# Patient Record
Sex: FEMALE | Race: WHITE | NOT HISPANIC OR LATINO | Employment: OTHER | URBAN - NONMETROPOLITAN AREA
[De-identification: names, ages, dates, MRNs, and addresses within clinical notes are randomized per-mention and may not be internally consistent; named-entity substitution may affect disease eponyms.]

---

## 2024-06-13 ENCOUNTER — APPOINTMENT (OUTPATIENT)
Dept: CARDIOLOGY | Facility: HOSPITAL | Age: 69
DRG: 195 | End: 2024-06-13
Payer: MEDICARE

## 2024-06-13 ENCOUNTER — HOSPITAL ENCOUNTER (INPATIENT)
Facility: HOSPITAL | Age: 69
LOS: 1 days | Discharge: HOME | DRG: 195 | End: 2024-06-15
Attending: INTERNAL MEDICINE | Admitting: INTERNAL MEDICINE
Payer: MEDICARE

## 2024-06-13 ENCOUNTER — APPOINTMENT (OUTPATIENT)
Dept: RADIOLOGY | Facility: HOSPITAL | Age: 69
DRG: 195 | End: 2024-06-13
Payer: MEDICARE

## 2024-06-13 DIAGNOSIS — R06.02 SHORTNESS OF BREATH: ICD-10-CM

## 2024-06-13 DIAGNOSIS — R53.1 GENERALIZED WEAKNESS: ICD-10-CM

## 2024-06-13 DIAGNOSIS — J15.9 COMMUNITY ACQUIRED BACTERIAL PNEUMONIA: Primary | ICD-10-CM

## 2024-06-13 DIAGNOSIS — J18.9 PNEUMONIA DUE TO INFECTIOUS ORGANISM, UNSPECIFIED LATERALITY, UNSPECIFIED PART OF LUNG: ICD-10-CM

## 2024-06-13 LAB
ALBUMIN SERPL BCP-MCNC: 4.4 G/DL (ref 3.4–5)
ALP SERPL-CCNC: 56 U/L (ref 33–136)
ALT SERPL W P-5'-P-CCNC: 20 U/L (ref 7–45)
ANION GAP BLDV CALCULATED.4IONS-SCNC: 10 MMOL/L (ref 10–25)
ANION GAP SERPL CALC-SCNC: 12 MMOL/L (ref 10–20)
APPEARANCE UR: CLEAR
AST SERPL W P-5'-P-CCNC: 25 U/L (ref 9–39)
ATRIAL RATE: 112 BPM
BASE EXCESS BLDV CALC-SCNC: 3.6 MMOL/L (ref -2–3)
BASOPHILS # BLD AUTO: 0.02 X10*3/UL (ref 0–0.1)
BASOPHILS NFR BLD AUTO: 0.3 %
BILIRUB SERPL-MCNC: 0.4 MG/DL (ref 0–1.2)
BILIRUB UR STRIP.AUTO-MCNC: NEGATIVE MG/DL
BNP SERPL-MCNC: 54 PG/ML (ref 0–99)
BODY TEMPERATURE: ABNORMAL
BUN SERPL-MCNC: 18 MG/DL (ref 6–23)
CA-I BLDV-SCNC: 1.1 MMOL/L (ref 1.1–1.33)
CALCIUM SERPL-MCNC: 9 MG/DL (ref 8.6–10.3)
CARDIAC TROPONIN I PNL SERPL HS: 6 NG/L (ref 0–13)
CARDIAC TROPONIN I PNL SERPL HS: 7 NG/L (ref 0–13)
CHLORIDE BLDV-SCNC: 100 MMOL/L (ref 98–107)
CHLORIDE SERPL-SCNC: 101 MMOL/L (ref 98–107)
CO2 SERPL-SCNC: 28 MMOL/L (ref 21–32)
COLOR UR: ABNORMAL
CREAT SERPL-MCNC: 0.75 MG/DL (ref 0.5–1.05)
EGFRCR SERPLBLD CKD-EPI 2021: 86 ML/MIN/1.73M*2
EOSINOPHIL # BLD AUTO: 0 X10*3/UL (ref 0–0.7)
EOSINOPHIL NFR BLD AUTO: 0 %
ERYTHROCYTE [DISTWIDTH] IN BLOOD BY AUTOMATED COUNT: 14.3 % (ref 11.5–14.5)
FLUAV RNA RESP QL NAA+PROBE: NOT DETECTED
FLUBV RNA RESP QL NAA+PROBE: NOT DETECTED
GLUCOSE BLDV-MCNC: 148 MG/DL (ref 74–99)
GLUCOSE SERPL-MCNC: 143 MG/DL (ref 74–99)
GLUCOSE UR STRIP.AUTO-MCNC: NORMAL MG/DL
HCO3 BLDV-SCNC: 28.5 MMOL/L (ref 22–26)
HCT VFR BLD AUTO: 41 % (ref 36–46)
HCT VFR BLD EST: 44 % (ref 36–46)
HGB BLD-MCNC: 13.5 G/DL (ref 12–16)
HGB BLDV-MCNC: 14.5 G/DL (ref 12–16)
IMM GRANULOCYTES # BLD AUTO: 0.02 X10*3/UL (ref 0–0.7)
IMM GRANULOCYTES NFR BLD AUTO: 0.3 % (ref 0–0.9)
INHALED O2 CONCENTRATION: 21 %
KETONES UR STRIP.AUTO-MCNC: ABNORMAL MG/DL
LACTATE BLDV-SCNC: 1.4 MMOL/L (ref 0.4–2)
LACTATE SERPL-SCNC: 1.3 MMOL/L (ref 0.4–2)
LEUKOCYTE ESTERASE UR QL STRIP.AUTO: NEGATIVE
LIPASE SERPL-CCNC: 13 U/L (ref 9–82)
LYMPHOCYTES # BLD AUTO: 0.46 X10*3/UL (ref 1.2–4.8)
LYMPHOCYTES NFR BLD AUTO: 5.9 %
MAGNESIUM SERPL-MCNC: 1.86 MG/DL (ref 1.6–2.4)
MCH RBC QN AUTO: 28.5 PG (ref 26–34)
MCHC RBC AUTO-ENTMCNC: 32.9 G/DL (ref 32–36)
MCV RBC AUTO: 87 FL (ref 80–100)
MONOCYTES # BLD AUTO: 0.43 X10*3/UL (ref 0.1–1)
MONOCYTES NFR BLD AUTO: 5.5 %
MUCOUS THREADS #/AREA URNS AUTO: ABNORMAL /LPF
NEUTROPHILS # BLD AUTO: 6.91 X10*3/UL (ref 1.2–7.7)
NEUTROPHILS NFR BLD AUTO: 88 %
NITRITE UR QL STRIP.AUTO: NEGATIVE
NRBC BLD-RTO: 0 /100 WBCS (ref 0–0)
OXYHGB MFR BLDV: 48.5 % (ref 45–75)
P AXIS: 77 DEGREES
P OFFSET: 193 MS
P ONSET: 142 MS
PCO2 BLDV: 43 MM HG (ref 41–51)
PH BLDV: 7.43 PH (ref 7.33–7.43)
PH UR STRIP.AUTO: 7 [PH]
PLATELET # BLD AUTO: 153 X10*3/UL (ref 150–450)
PO2 BLDV: 31 MM HG (ref 35–45)
POTASSIUM BLDV-SCNC: 3.9 MMOL/L (ref 3.5–5.3)
POTASSIUM SERPL-SCNC: 3.7 MMOL/L (ref 3.5–5.3)
PR INTERVAL: 162 MS
PROT SERPL-MCNC: 7.4 G/DL (ref 6.4–8.2)
PROT UR STRIP.AUTO-MCNC: ABNORMAL MG/DL
Q ONSET: 223 MS
QRS COUNT: 18 BEATS
QRS DURATION: 90 MS
QT INTERVAL: 350 MS
QTC CALCULATION(BAZETT): 477 MS
QTC FREDERICIA: 431 MS
R AXIS: 35 DEGREES
RBC # BLD AUTO: 4.73 X10*6/UL (ref 4–5.2)
RBC # UR STRIP.AUTO: ABNORMAL /UL
RBC #/AREA URNS AUTO: >20 /HPF
RSV RNA RESP QL NAA+PROBE: NOT DETECTED
SAO2 % BLDV: 49 % (ref 45–75)
SARS-COV-2 RNA RESP QL NAA+PROBE: NOT DETECTED
SODIUM BLDV-SCNC: 135 MMOL/L (ref 136–145)
SODIUM SERPL-SCNC: 137 MMOL/L (ref 136–145)
SP GR UR STRIP.AUTO: >1.05
SQUAMOUS #/AREA URNS AUTO: ABNORMAL /HPF
T AXIS: 62 DEGREES
T OFFSET: 398 MS
UROBILINOGEN UR STRIP.AUTO-MCNC: NORMAL MG/DL
VENTRICULAR RATE: 112 BPM
WBC # BLD AUTO: 7.8 X10*3/UL (ref 4.4–11.3)
WBC #/AREA URNS AUTO: ABNORMAL /HPF

## 2024-06-13 PROCEDURE — 96375 TX/PRO/DX INJ NEW DRUG ADDON: CPT | Mod: 59

## 2024-06-13 PROCEDURE — 84132 ASSAY OF SERUM POTASSIUM: CPT

## 2024-06-13 PROCEDURE — 2550000001 HC RX 255 CONTRASTS

## 2024-06-13 PROCEDURE — 83605 ASSAY OF LACTIC ACID: CPT | Mod: 91

## 2024-06-13 PROCEDURE — 2500000004 HC RX 250 GENERAL PHARMACY W/ HCPCS (ALT 636 FOR OP/ED)

## 2024-06-13 PROCEDURE — 2500000001 HC RX 250 WO HCPCS SELF ADMINISTERED DRUGS (ALT 637 FOR MEDICARE OP)

## 2024-06-13 PROCEDURE — 99285 EMERGENCY DEPT VISIT HI MDM: CPT

## 2024-06-13 PROCEDURE — 94640 AIRWAY INHALATION TREATMENT: CPT

## 2024-06-13 PROCEDURE — 94760 N-INVAS EAR/PLS OXIMETRY 1: CPT

## 2024-06-13 PROCEDURE — 81001 URINALYSIS AUTO W/SCOPE: CPT

## 2024-06-13 PROCEDURE — 96368 THER/DIAG CONCURRENT INF: CPT

## 2024-06-13 PROCEDURE — G0378 HOSPITAL OBSERVATION PER HR: HCPCS

## 2024-06-13 PROCEDURE — 83690 ASSAY OF LIPASE: CPT

## 2024-06-13 PROCEDURE — 36415 COLL VENOUS BLD VENIPUNCTURE: CPT

## 2024-06-13 PROCEDURE — 87040 BLOOD CULTURE FOR BACTERIA: CPT | Mod: GENLAB

## 2024-06-13 PROCEDURE — 2500000002 HC RX 250 W HCPCS SELF ADMINISTERED DRUGS (ALT 637 FOR MEDICARE OP, ALT 636 FOR OP/ED)

## 2024-06-13 PROCEDURE — 83735 ASSAY OF MAGNESIUM: CPT

## 2024-06-13 PROCEDURE — 84145 PROCALCITONIN (PCT): CPT | Mod: GENLAB

## 2024-06-13 PROCEDURE — 80053 COMPREHEN METABOLIC PANEL: CPT

## 2024-06-13 PROCEDURE — 93005 ELECTROCARDIOGRAM TRACING: CPT

## 2024-06-13 PROCEDURE — 85025 COMPLETE CBC W/AUTO DIFF WBC: CPT

## 2024-06-13 PROCEDURE — 84484 ASSAY OF TROPONIN QUANT: CPT

## 2024-06-13 PROCEDURE — 71275 CT ANGIOGRAPHY CHEST: CPT | Performed by: RADIOLOGY

## 2024-06-13 PROCEDURE — 96361 HYDRATE IV INFUSION ADD-ON: CPT

## 2024-06-13 PROCEDURE — 71275 CT ANGIOGRAPHY CHEST: CPT

## 2024-06-13 PROCEDURE — 74177 CT ABD & PELVIS W/CONTRAST: CPT | Performed by: RADIOLOGY

## 2024-06-13 PROCEDURE — 96365 THER/PROPH/DIAG IV INF INIT: CPT | Mod: 59

## 2024-06-13 PROCEDURE — 96372 THER/PROPH/DIAG INJ SC/IM: CPT

## 2024-06-13 PROCEDURE — 83880 ASSAY OF NATRIURETIC PEPTIDE: CPT

## 2024-06-13 PROCEDURE — 74177 CT ABD & PELVIS W/CONTRAST: CPT

## 2024-06-13 PROCEDURE — 87637 SARSCOV2&INF A&B&RSV AMP PRB: CPT

## 2024-06-13 PROCEDURE — 84484 ASSAY OF TROPONIN QUANT: CPT | Mod: 91

## 2024-06-13 RX ORDER — ACETAMINOPHEN 325 MG/1
TABLET ORAL
Status: COMPLETED
Start: 2024-06-13 | End: 2024-06-13

## 2024-06-13 RX ORDER — CEFTRIAXONE 1 G/50ML
1 INJECTION, SOLUTION INTRAVENOUS ONCE
Status: COMPLETED | OUTPATIENT
Start: 2024-06-13 | End: 2024-06-13

## 2024-06-13 RX ORDER — IPRATROPIUM BROMIDE AND ALBUTEROL SULFATE 2.5; .5 MG/3ML; MG/3ML
SOLUTION RESPIRATORY (INHALATION)
Status: COMPLETED
Start: 2024-06-13 | End: 2024-06-13

## 2024-06-13 RX ORDER — PANTOPRAZOLE SODIUM 40 MG/10ML
40 INJECTION, POWDER, LYOPHILIZED, FOR SOLUTION INTRAVENOUS
Status: DISCONTINUED | OUTPATIENT
Start: 2024-06-14 | End: 2024-06-15 | Stop reason: HOSPADM

## 2024-06-13 RX ORDER — LIOTHYRONINE SODIUM 5 UG/1
10 TABLET ORAL 2 TIMES DAILY
COMMUNITY

## 2024-06-13 RX ORDER — KETOROLAC TROMETHAMINE 15 MG/ML
15 INJECTION, SOLUTION INTRAMUSCULAR; INTRAVENOUS ONCE
Status: COMPLETED | OUTPATIENT
Start: 2024-06-13 | End: 2024-06-13

## 2024-06-13 RX ORDER — PROBENECID AND COLCHICINE .5; 5 MG/1; MG/1
1 TABLET ORAL DAILY
Status: ON HOLD | COMMUNITY
End: 2024-06-14

## 2024-06-13 RX ORDER — CEFTRIAXONE 2 G/50ML
2 INJECTION, SOLUTION INTRAVENOUS EVERY 24 HOURS
Status: DISCONTINUED | OUTPATIENT
Start: 2024-06-14 | End: 2024-06-15 | Stop reason: HOSPADM

## 2024-06-13 RX ORDER — SODIUM CHLORIDE 9 MG/ML
INJECTION, SOLUTION INTRAVENOUS
Status: DISPENSED
Start: 2024-06-13 | End: 2024-06-14

## 2024-06-13 RX ORDER — AZITHROMYCIN MONOHYDRATE 500 MG/5ML
INJECTION, POWDER, LYOPHILIZED, FOR SOLUTION INTRAVENOUS
Status: COMPLETED
Start: 2024-06-13 | End: 2024-06-13

## 2024-06-13 RX ORDER — ONDANSETRON 4 MG/1
4 TABLET, FILM COATED ORAL EVERY 8 HOURS PRN
Status: DISCONTINUED | OUTPATIENT
Start: 2024-06-13 | End: 2024-06-15 | Stop reason: HOSPADM

## 2024-06-13 RX ORDER — ENOXAPARIN SODIUM 100 MG/ML
40 INJECTION SUBCUTANEOUS EVERY 24 HOURS
Status: DISCONTINUED | OUTPATIENT
Start: 2024-06-13 | End: 2024-06-15 | Stop reason: HOSPADM

## 2024-06-13 RX ORDER — IPRATROPIUM BROMIDE AND ALBUTEROL SULFATE 2.5; .5 MG/3ML; MG/3ML
3 SOLUTION RESPIRATORY (INHALATION)
Status: DISCONTINUED | OUTPATIENT
Start: 2024-06-13 | End: 2024-06-13

## 2024-06-13 RX ORDER — ONDANSETRON HYDROCHLORIDE 2 MG/ML
4 INJECTION, SOLUTION INTRAVENOUS ONCE
Status: COMPLETED | OUTPATIENT
Start: 2024-06-13 | End: 2024-06-13

## 2024-06-13 RX ORDER — PANTOPRAZOLE SODIUM 40 MG/1
40 TABLET, DELAYED RELEASE ORAL
Status: DISCONTINUED | OUTPATIENT
Start: 2024-06-14 | End: 2024-06-15 | Stop reason: HOSPADM

## 2024-06-13 RX ORDER — GUAIFENESIN/DEXTROMETHORPHAN 100-10MG/5
5 SYRUP ORAL EVERY 4 HOURS PRN
Status: DISCONTINUED | OUTPATIENT
Start: 2024-06-13 | End: 2024-06-15 | Stop reason: HOSPADM

## 2024-06-13 RX ORDER — POLYETHYLENE GLYCOL 3350 17 G/17G
17 POWDER, FOR SOLUTION ORAL DAILY PRN
Status: DISCONTINUED | OUTPATIENT
Start: 2024-06-13 | End: 2024-06-15 | Stop reason: HOSPADM

## 2024-06-13 RX ORDER — LEVOTHYROXINE SODIUM 75 UG/1
88 TABLET ORAL DAILY
COMMUNITY

## 2024-06-13 RX ORDER — GUAIFENESIN 600 MG/1
600 TABLET, EXTENDED RELEASE ORAL EVERY 12 HOURS PRN
Status: DISCONTINUED | OUTPATIENT
Start: 2024-06-13 | End: 2024-06-15 | Stop reason: HOSPADM

## 2024-06-13 RX ORDER — ACETAMINOPHEN 325 MG/1
650 TABLET ORAL EVERY 4 HOURS PRN
Status: DISCONTINUED | OUTPATIENT
Start: 2024-06-13 | End: 2024-06-13

## 2024-06-13 RX ORDER — SERTRALINE HYDROCHLORIDE 50 MG/1
50 TABLET, FILM COATED ORAL DAILY
COMMUNITY

## 2024-06-13 RX ORDER — ATORVASTATIN CALCIUM 20 MG/1
20 TABLET, FILM COATED ORAL NIGHTLY
COMMUNITY

## 2024-06-13 RX ORDER — SODIUM CHLORIDE 9 MG/ML
100 INJECTION, SOLUTION INTRAVENOUS CONTINUOUS
Status: DISCONTINUED | OUTPATIENT
Start: 2024-06-13 | End: 2024-06-13

## 2024-06-13 RX ORDER — ACETAMINOPHEN 325 MG/1
650 TABLET ORAL EVERY 4 HOURS PRN
Status: DISCONTINUED | OUTPATIENT
Start: 2024-06-13 | End: 2024-06-15 | Stop reason: HOSPADM

## 2024-06-13 RX ORDER — ACETAMINOPHEN 325 MG/1
975 TABLET ORAL ONCE
Status: COMPLETED | OUTPATIENT
Start: 2024-06-13 | End: 2024-06-13

## 2024-06-13 RX ORDER — TALC
9 POWDER (GRAM) TOPICAL NIGHTLY PRN
Status: DISCONTINUED | OUTPATIENT
Start: 2024-06-13 | End: 2024-06-14

## 2024-06-13 RX ORDER — MELATONIN 1 MG
TABLET,CHEWABLE ORAL
COMMUNITY

## 2024-06-13 RX ORDER — IPRATROPIUM BROMIDE AND ALBUTEROL SULFATE 2.5; .5 MG/3ML; MG/3ML
3 SOLUTION RESPIRATORY (INHALATION) 3 TIMES DAILY
Status: DISCONTINUED | OUTPATIENT
Start: 2024-06-13 | End: 2024-06-15 | Stop reason: HOSPADM

## 2024-06-13 RX ORDER — ONDANSETRON HYDROCHLORIDE 2 MG/ML
4 INJECTION, SOLUTION INTRAVENOUS EVERY 8 HOURS PRN
Status: DISCONTINUED | OUTPATIENT
Start: 2024-06-13 | End: 2024-06-15 | Stop reason: HOSPADM

## 2024-06-13 RX ORDER — ALBUTEROL SULFATE 0.83 MG/ML
2.5 SOLUTION RESPIRATORY (INHALATION) EVERY 2 HOUR PRN
Status: DISCONTINUED | OUTPATIENT
Start: 2024-06-13 | End: 2024-06-15 | Stop reason: HOSPADM

## 2024-06-13 RX ADMIN — IPRATROPIUM BROMIDE AND ALBUTEROL SULFATE 3 ML: .5; 3 SOLUTION RESPIRATORY (INHALATION) at 22:39

## 2024-06-13 RX ADMIN — AZITHROMYCIN 500 MG: 500 INJECTION, POWDER, LYOPHILIZED, FOR SOLUTION INTRAVENOUS at 16:05

## 2024-06-13 RX ADMIN — ENOXAPARIN SODIUM 40 MG: 40 INJECTION SUBCUTANEOUS at 20:20

## 2024-06-13 RX ADMIN — SODIUM CHLORIDE 1000 ML: 9 INJECTION, SOLUTION INTRAVENOUS at 15:19

## 2024-06-13 RX ADMIN — SODIUM CHLORIDE 1000 ML: 9 INJECTION, SOLUTION INTRAVENOUS at 15:34

## 2024-06-13 RX ADMIN — BENZOCAINE AND MENTHOL 1 LOZENGE: 15; 3.6 LOZENGE ORAL at 20:31

## 2024-06-13 RX ADMIN — ACETAMINOPHEN 650 MG: 325 TABLET ORAL at 20:20

## 2024-06-13 RX ADMIN — Medication 9 MG: at 20:31

## 2024-06-13 RX ADMIN — KETOROLAC TROMETHAMINE 15 MG: 15 INJECTION INTRAMUSCULAR; INTRAVENOUS at 15:19

## 2024-06-13 RX ADMIN — CEFTRIAXONE 1 G: 1 INJECTION, SOLUTION INTRAVENOUS at 16:07

## 2024-06-13 RX ADMIN — ACETAMINOPHEN 975 MG: 325 TABLET ORAL at 16:13

## 2024-06-13 RX ADMIN — ONDANSETRON 4 MG: 2 INJECTION INTRAMUSCULAR; INTRAVENOUS at 15:18

## 2024-06-13 RX ADMIN — IOHEXOL 75 ML: 350 INJECTION, SOLUTION INTRAVENOUS at 15:29

## 2024-06-13 SDOH — SOCIAL STABILITY: SOCIAL INSECURITY: DO YOU FEEL ANYONE HAS EXPLOITED OR TAKEN ADVANTAGE OF YOU FINANCIALLY OR OF YOUR PERSONAL PROPERTY?: NO

## 2024-06-13 SDOH — SOCIAL STABILITY: SOCIAL INSECURITY: DOES ANYONE TRY TO KEEP YOU FROM HAVING/CONTACTING OTHER FRIENDS OR DOING THINGS OUTSIDE YOUR HOME?: NO

## 2024-06-13 SDOH — SOCIAL STABILITY: SOCIAL INSECURITY: HAVE YOU HAD THOUGHTS OF HARMING ANYONE ELSE?: NO

## 2024-06-13 SDOH — SOCIAL STABILITY: SOCIAL INSECURITY: WERE YOU ABLE TO COMPLETE ALL THE BEHAVIORAL HEALTH SCREENINGS?: YES

## 2024-06-13 SDOH — SOCIAL STABILITY: SOCIAL INSECURITY: ARE YOU OR HAVE YOU BEEN THREATENED OR ABUSED PHYSICALLY, EMOTIONALLY, OR SEXUALLY BY ANYONE?: NO

## 2024-06-13 SDOH — SOCIAL STABILITY: SOCIAL INSECURITY: ARE THERE ANY APPARENT SIGNS OF INJURIES/BEHAVIORS THAT COULD BE RELATED TO ABUSE/NEGLECT?: NO

## 2024-06-13 SDOH — SOCIAL STABILITY: SOCIAL INSECURITY: HAS ANYONE EVER THREATENED TO HURT YOUR FAMILY OR YOUR PETS?: NO

## 2024-06-13 SDOH — SOCIAL STABILITY: SOCIAL INSECURITY: DO YOU FEEL UNSAFE GOING BACK TO THE PLACE WHERE YOU ARE LIVING?: NO

## 2024-06-13 SDOH — SOCIAL STABILITY: SOCIAL INSECURITY: HAVE YOU HAD ANY THOUGHTS OF HARMING ANYONE ELSE?: NO

## 2024-06-13 SDOH — SOCIAL STABILITY: SOCIAL INSECURITY: ABUSE: ADULT

## 2024-06-13 ASSESSMENT — COGNITIVE AND FUNCTIONAL STATUS - GENERAL
TOILETING: A LITTLE
HELP NEEDED FOR BATHING: A LITTLE
TOILETING: A LITTLE
PATIENT BASELINE BEDBOUND: NO
HELP NEEDED FOR BATHING: A LITTLE
CLIMB 3 TO 5 STEPS WITH RAILING: A LOT
MOBILITY SCORE: 19
STANDING UP FROM CHAIR USING ARMS: A LITTLE
DRESSING REGULAR UPPER BODY CLOTHING: A LITTLE
DRESSING REGULAR UPPER BODY CLOTHING: A LITTLE
DRESSING REGULAR LOWER BODY CLOTHING: A LITTLE
CLIMB 3 TO 5 STEPS WITH RAILING: A LOT
DAILY ACTIVITIY SCORE: 20
STANDING UP FROM CHAIR USING ARMS: A LITTLE
DRESSING REGULAR LOWER BODY CLOTHING: A LITTLE
WALKING IN HOSPITAL ROOM: A LITTLE
MOVING TO AND FROM BED TO CHAIR: A LITTLE
WALKING IN HOSPITAL ROOM: A LITTLE
MOVING TO AND FROM BED TO CHAIR: A LITTLE

## 2024-06-13 ASSESSMENT — LIFESTYLE VARIABLES
HOW OFTEN DO YOU HAVE A DRINK CONTAINING ALCOHOL: NEVER
AUDIT-C TOTAL SCORE: 0
SKIP TO QUESTIONS 9-10: 1
HOW MANY STANDARD DRINKS CONTAINING ALCOHOL DO YOU HAVE ON A TYPICAL DAY: PATIENT DOES NOT DRINK
AUDIT-C TOTAL SCORE: 0
HOW OFTEN DO YOU HAVE 6 OR MORE DRINKS ON ONE OCCASION: NEVER

## 2024-06-13 ASSESSMENT — ACTIVITIES OF DAILY LIVING (ADL)
TOILETING: NEEDS ASSISTANCE
DRESSING YOURSELF: INDEPENDENT
PATIENT'S MEMORY ADEQUATE TO SAFELY COMPLETE DAILY ACTIVITIES?: YES
BATHING: NEEDS ASSISTANCE
ASSISTIVE_DEVICE: WALKER
FEEDING YOURSELF: INDEPENDENT
WALKS IN HOME: NEEDS ASSISTANCE
GROOMING: INDEPENDENT
ADEQUATE_TO_COMPLETE_ADL: YES
LACK_OF_TRANSPORTATION: NO
HEARING - RIGHT EAR: FUNCTIONAL
HEARING - LEFT EAR: FUNCTIONAL
JUDGMENT_ADEQUATE_SAFELY_COMPLETE_DAILY_ACTIVITIES: YES

## 2024-06-13 ASSESSMENT — PATIENT HEALTH QUESTIONNAIRE - PHQ9
2. FEELING DOWN, DEPRESSED OR HOPELESS: NOT AT ALL
SUM OF ALL RESPONSES TO PHQ9 QUESTIONS 1 & 2: 0
1. LITTLE INTEREST OR PLEASURE IN DOING THINGS: NOT AT ALL

## 2024-06-13 ASSESSMENT — PAIN SCALES - GENERAL
PAINLEVEL_OUTOF10: 3
PAINLEVEL_OUTOF10: 8
PAINLEVEL_OUTOF10: 3
PAINLEVEL_OUTOF10: 0 - NO PAIN

## 2024-06-13 ASSESSMENT — PAIN - FUNCTIONAL ASSESSMENT
PAIN_FUNCTIONAL_ASSESSMENT: 0-10

## 2024-06-13 ASSESSMENT — COLUMBIA-SUICIDE SEVERITY RATING SCALE - C-SSRS
1. IN THE PAST MONTH, HAVE YOU WISHED YOU WERE DEAD OR WISHED YOU COULD GO TO SLEEP AND NOT WAKE UP?: NO
6. HAVE YOU EVER DONE ANYTHING, STARTED TO DO ANYTHING, OR PREPARED TO DO ANYTHING TO END YOUR LIFE?: NO
2. HAVE YOU ACTUALLY HAD ANY THOUGHTS OF KILLING YOURSELF?: NO

## 2024-06-13 ASSESSMENT — PAIN DESCRIPTION - LOCATION: LOCATION: HEAD

## 2024-06-13 NOTE — ED PROVIDER NOTES
HPI   Chief Complaint   Patient presents with    Weakness, Gen    Flu Symptoms     Pt states since yesterday morning she has had body aches, congestion, cough, weakness, nausea, diarrhea, fevers, and headaches.        Patient is a 68-year-old female with significant PMH of breast cancer 20 years ago presents to the ED with cc of generalized weakness shortness of breath and flulike symptoms times yesterday.  Patient denies any contacts with similar symptoms.  Patient endorses productive cough with green-yellow sputum.  Patient has had subjective fevers.  Patient patient states shortness of breath is worse with exertion.  Patient denies any history of MI blood clots recent travel or surgery.  Patient denies any history of asthma or COPD.  Patient denies any history of CHF.  Patient has had rhinorrhea congestion and nausea.  Patient denies any vomiting.  Patient states she has been tolerating p.o. intake.  Patient states she normally ambulates without assistance but since yesterday has had difficulty moving around due to extreme weakness.  Patient endorses right lower quadrant pain that radiates into the right flank.  Patient does have history of kidney stones.  Patient states it feels similar to when she has had kidney stones in the past.  Patient denies any history of abdominal surgeries and still has her appendix.  Patient denies any dysuria or hematuria.  Patient denies any numbness or tingling.  Patient does not wear oxygen at home.  Patient is a former smoker denies any alcohol or street drug abuse.                          Bart Coma Scale Score: 15                     Patient History   History reviewed. No pertinent past medical history.  Past Surgical History:   Procedure Laterality Date    OTHER SURGICAL HISTORY  11/02/2020    Mastectomy     No family history on file.  Social History     Tobacco Use    Smoking status: Former     Types: Cigarettes    Smokeless tobacco: Never   Substance Use Topics    Alcohol  use: Never    Drug use: Never       Physical Exam   ED Triage Vitals [06/13/24 1338]   Temperature Heart Rate Respirations BP   37.8 °C (100.1 °F) (!) 119 16 143/83      Pulse Ox Temp Source Heart Rate Source Patient Position   (!) 93 % Temporal -- --      BP Location FiO2 (%)     -- --       Physical Exam  HENT:      Head: Normocephalic.   Eyes:      Extraocular Movements: Extraocular movements intact.      Pupils: Pupils are equal, round, and reactive to light.   Cardiovascular:      Rate and Rhythm: Regular rhythm. Tachycardia present.      Pulses: Normal pulses.   Pulmonary:      Effort: Pulmonary effort is normal.      Breath sounds: No wheezing, rhonchi or rales.   Abdominal:      Palpations: Abdomen is soft.      Tenderness: There is no abdominal tenderness. There is no right CVA tenderness, left CVA tenderness, guarding or rebound.   Musculoskeletal:         General: No tenderness.      Cervical back: Normal range of motion.      Right lower leg: No edema.      Left lower leg: No edema.   Skin:     Capillary Refill: Capillary refill takes less than 2 seconds.   Neurological:      General: No focal deficit present.      Mental Status: She is alert and oriented to person, place, and time. Mental status is at baseline.      Cranial Nerves: No cranial nerve deficit.      Sensory: No sensory deficit.      Motor: Weakness (Bilateral lower extremities and upper extremities) present.         ED Course & MDM   ED Course as of 06/14/24 1334   Thu Jun 13, 2024   1410 EKG interpretation performed at 1403 sinus tachycardia, normal axis no acute signs of ischemia.  Ventricular rate 112 bpm []      ED Course User Index  [] Guerita Sims PA-C         Diagnoses as of 06/14/24 1334   Generalized weakness   Shortness of breath   Pneumonia due to infectious organism, unspecified laterality, unspecified part of lung   Community acquired bacterial pneumonia       Medical Decision Making  Medical Decision  Making:  Patient presented as described in HPI. Patient case including ROS, PE, and treatment and plan discussed with ED attending if attached as cosigner. Due to patients presentation orders completed include as documented.  Patient presents to the ED with cc of generalized weakness shortness of breath and flulike symptoms times yesterday.  Patient has productive cough nausea.  Patient states shortness of breath is worse with exertion.  Is also having right lower quadrant abdominal pain that is rating into the flank now.  Patient denies any history of MI blood clots recent travel or surgery.  Patient is tachycardic 119 and hypoxic 93%.  Patient given fluids Zofran and Toradol for symptoms.  Patient had difficulty ambulating on her own and needed assistance to get into the bed.  Patient states this is not her baseline.  Patient is nontoxic-appearing, abdomen is soft and nontender lung sounds are clear bilaterally no focal deficits.  Patient does have weakness in bilateral lower extremities and upper extremities.  Pending labs and imaging.  Labs are unremarkable.  CT PE shows mild emphysema with upper lobe predominance.  Findings of prior granulomatous disease as described.  Stable small grouping of reticulonodular densities in the posterior lateral left lower lobe consistent with sequela of remote infection or inflammation.  There are several nonspecific noncalcified right lower lobe pulmonary nodules which were not clearly evident on the previous CT scans of the abdomen pelvis measuring 11 mm in diameter or less.  There is also small inflammatory appearing 6 mm left lower lobe lung nodule which is not evident previously.  CT abdomen is unremarkable.  Patient educated on these findings.  Patient will need admission.  Patient was given IV antibiotics.  I spoke with the hospitalist who accepts the patient.  Patient remained stable in the ER.        Patient care discussed with: N/A  Social Determinants affecting care:  N/A    Final diagnosis and disposition as below.  See CI    Hospitalize. I discussed the differential; results and admit plan with the patient and/or family/friend/caregiver if present.  I emphasized the importance of hospitalization need for re-evaluation/continued monitoring/interventions.. They agreed that if they feel their condition is worsening or if they have any other concern they should alert staff immediately for further assistance. I gave the patient an opportunity to ask all questions they had and answered all of them accordingly. The patient and/or family/friend/caregiver expressed understanding verbally and that they would comply.       Disposition:  Admit    Hospitalize. Discussed findings and treatment done here in ED with admitting physician. It would be a risk to discharge the patient in their condition due to possibility of deterioration in their condition and the need for urgent interventions.    This note has been transcribed using voice recognition and may contain grammatical errors, misplaced words, incorrect words, incorrect phrases or other errors.        CT angio chest for pulmonary embolism   Final Result   No CT evidence of pulmonary embolism in the current exam.        Mild emphysema with upper lobe predominance. Band of linear scarring   versus less likely atelectasis in the medial left lung apex.        Findings of prior granulomatous disease as described.        Stable small grouping of reticulonodular densities in the   posterolateral left lower lobe consistent with sequela of remote   infection or inflammation.        There are several nonspecific, noncalcified right lower lobe   pulmonary nodules which were not clearly evident on the previous CT   scans of the abdomen and pelvis measuring 11 mm in diameter or less.   There is also a small inflammatory appearing 6 mm left lower lobe   lung nodule which was not evident previously. These could be foci of   inflammation or infection.  These could be development of interval   scars. Tumor is not excluded. Consider either a follow-up CT scan in   3-6 months, or a PET-CT scan in follow-up.        Mild nonspecific distention of the mid esophagus with gas and   ingested material. Consider follow-up with upper endoscopy or barium   esophagram.        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        MACRO:   None        Signed by: Zelalem Sampson 6/13/2024 3:53 PM   Dictation workstation:   [id number]      CT abdomen pelvis w IV contrast   Final Result   Mild nonspecific free pelvic fluid.        Retroflexed uterus with small uterine fibroids.        Nonobstructing lower pole left renal stone. No hydronephrosis on   either side.        Small bilateral renal cysts.        Interval right hip arthroplasty.        Arthritic changes in the lumbosacral spine as described.        MACRO:   None        Signed by: Zelalem Sampson 6/13/2024 4:03 PM   Dictation workstation:   [id number]         Labs Reviewed   CBC WITH AUTO DIFFERENTIAL - Abnormal       Result Value    WBC 7.8      nRBC 0.0      RBC 4.73      Hemoglobin 13.5      Hematocrit 41.0      MCV 87      MCH 28.5      MCHC 32.9      RDW 14.3      Platelets 153      Neutrophils % 88.0      Immature Granulocytes %, Automated 0.3      Lymphocytes % 5.9      Monocytes % 5.5      Eosinophils % 0.0      Basophils % 0.3      Neutrophils Absolute 6.91      Immature Granulocytes Absolute, Automated 0.02      Lymphocytes Absolute 0.46 (*)     Monocytes Absolute 0.43      Eosinophils Absolute 0.00      Basophils Absolute 0.02     COMPREHENSIVE METABOLIC PANEL - Abnormal    Glucose 143 (*)     Sodium 137      Potassium 3.7      Chloride 101      Bicarbonate 28      Anion Gap 12      Urea Nitrogen 18      Creatinine 0.75      eGFR 86      Calcium 9.0      Albumin 4.4      Alkaline Phosphatase 56      Total Protein 7.4      AST 25      Bilirubin, Total 0.4      ALT 20      BLOOD GAS VENOUS FULL PANEL - Abnormal    POCT pH, Venous 7.43      POCT pCO2, Venous 43      POCT pO2, Venous 31 (*)     POCT SO2, Venous 49      POCT Oxy Hemoglobin, Venous 48.5      POCT Hematocrit Calculated, Venous 44.0      POCT Sodium, Venous 135 (*)     POCT Potassium, Venous 3.9      POCT Chloride, Venous 100      POCT Ionized Calicum, Venous 1.10      POCT Glucose, Venous 148 (*)     POCT Lactate, Venous 1.4      POCT Base Excess, Venous 3.6 (*)     POCT HCO3 Calculated, Venous 28.5 (*)     POCT Hemoglobin, Venous 14.5      POCT Anion Gap, Venous 10.0      Patient Temperature        FiO2 21     B-TYPE NATRIURETIC PEPTIDE - Normal    BNP 54      Narrative:        <100 pg/mL - Heart failure unlikely  100-299 pg/mL - Intermediate probability of acute heart                  failure exacerbation. Correlate with clinical                  context and patient history.    >=300 pg/mL - Heart Failure likely. Correlate with clinical                  context and patient history.    BNP testing is performed using different testing methodology at [facility] than at other [facility]. Direct result comparisons should only be made within the same method.      MAGNESIUM - Normal    Magnesium 1.86     INFLUENZA A AND B PCR - Normal    Flu A Result Not Detected      Flu B Result Not Detected      Narrative:     This assay is an in vitro diagnostic multiplex nucleic acid amplification test for the detection and discrimination of Influenza A & B from nasopharyngeal specimens, and has been validated for use at [facility]. Negative results do not preclude Influenza A/B infections, and should not be used as the sole basis for diagnosis, treatment, or other management decisions. If Influenza A/B and RSV PCR results are negative, testing for Parainfluenza virus, Adenovirus and Metapneumovirus is routinely performed for [facility] pediatric oncology and intensive care inpatients, and is  available on other patients by placing an add-on request.   SARS-COV-2 PCR - Normal    Coronavirus 2019, PCR Not Detected      Narrative:     This assay has received FDA Emergency Use Authorization (EUA) and is only authorized for the duration of time that circumstances exist to justify the authorization of the emergency use of in vitro diagnostic tests for the detection of SARS-CoV-2 virus and/or diagnosis of COVID-19 infection under section 564(b)(1) of the Act, 21 U.S.C. 360bbb-3(b)(1). This assay is an in vitro diagnostic nucleic acid amplification test for the qualitative detection of SARS-CoV-2 from nasopharyngeal specimens and has been validated for use at [facility]. Negative results do not preclude COVID-19 infections and should not be used as the sole basis for diagnosis, treatment, or other management decisions.     RSV PCR - Normal    RSV PCR Not Detected      Narrative:     This assay is an FDA-cleared, in vitro diagnostic nucleic acid amplification test for the detection of RSV from nasopharyngeal specimens, and has been validated for use at [facility]. Negative results do not preclude RSV infections, and should not be used as the sole basis for diagnosis, treatment, or other management decisions. If Influenza A/B and RSV PCR results are negative, testing for Parainfluenza virus, Adenovirus and Metapneumovirus is routinely performed for pediatric oncology and intensive care inpatients at [facility], and is available on other patients by placing an add-on request.       SERIAL TROPONIN-INITIAL - Normal    Troponin I, High Sensitivity 6      Narrative:     Less than 99th percentile of normal range cutoff-  Female and children under 18 years old <14 ng/L; Male <21 ng/L: Negative  Repeat testing should be performed if clinically indicated.     Female and children under 18 years old 14-50 ng/L; Male 21-50 ng/L:  Consistent with possible cardiac damage and possible  increased clinical   risk. Serial measurements may help to assess extent of myocardial damage.     >50 ng/L: Consistent with cardiac damage, increased clinical risk and  myocardial infarction. Serial measurements may help assess extent of   myocardial damage.      NOTE: Children less than 1 year old may have higher baseline troponin   levels and results should be interpreted in conjunction with the overall   clinical context.     NOTE: Troponin I testing is performed using a different   testing methodology at [facility] than at other   [facility]. Direct result comparisons should only   be made within the same method.   LACTATE - Normal    Lactate 1.3      Narrative:     Venipuncture immediately after or during the administration of Metamizole may lead to falsely low results. Testing should be performed immediately  prior to Metamizole dosing.   LIPASE - Normal    Lipase 13      Narrative:     Venipuncture immediately after or during the administration of Metamizole may lead to falsely low results. Testing should be performed immediately prior to Metamizole dosing.   SERIAL TROPONIN, 1 HOUR - Normal    Troponin I, High Sensitivity 7      Narrative:     Less than 99th percentile of normal range cutoff-  Female and children under 18 years old <14 ng/L; Male <21 ng/L: Negative  Repeat testing should be performed if clinically indicated.     Female and children under 18 years old 14-50 ng/L; Male 21-50 ng/L:  Consistent with possible cardiac damage and possible increased clinical   risk. Serial measurements may help to assess extent of myocardial damage.     >50 ng/L: Consistent with cardiac damage, increased clinical risk and  myocardial infarction. Serial measurements may help assess extent of   myocardial damage.      NOTE: Children less than 1 year old may have higher baseline troponin   levels and results should be interpreted in conjunction with the overall   clinical context.     NOTE:  Troponin I testing is performed using a different   testing methodology at [facility] than at other   [facility]. Direct result comparisons should only   be made within the same method.   TROPONIN SERIES- (INITIAL, 1 HR)    Narrative:     The following orders were created for panel order Troponin I Series, High Sensitivity (0, 1 HR).  Procedure                               Abnormality         Status                     ---------                               -----------         ------                     Troponin I, High Sensiti...[[id number]]  Normal              Final result               Troponin, High Sensitivi...[[id number]]  Normal              Final result                 Please view results for these tests on the individual orders.   URINALYSIS WITH REFLEX CULTURE AND MICROSCOPIC    Narrative:     The following orders were created for panel order Urinalysis with Reflex Culture and Microscopic.  Procedure                               Abnormality         Status                     ---------                               -----------         ------                     Urinalysis with Reflex C...[[id number]]                                                 Extra Urine Gray Tube[[id number]]                                                         Please view results for these tests on the individual orders.   URINALYSIS WITH REFLEX CULTURE AND MICROSCOPIC   EXTRA URINE GRAY TUBE      Procedure  Procedures     Guerita Sims PA-C  06/13/24 1622       Guerita Sims PA-C  06/14/24 2624

## 2024-06-13 NOTE — NURSING NOTE
Assumed patient care. Patient resting in bed with no immediate needs at this time. Call light within reach.     6/14/24    0300 Patient resting in bed. No immediate needs at this time. Night has been uneventful. Call light within reach.

## 2024-06-13 NOTE — ED TRIAGE NOTES
Pt states since yesterday morning she has had body aches, congestion, cough, weakness, nausea, diarrhea, fevers, and headaches.

## 2024-06-14 ENCOUNTER — APPOINTMENT (OUTPATIENT)
Dept: RADIOLOGY | Facility: HOSPITAL | Age: 69
DRG: 195 | End: 2024-06-14
Payer: MEDICARE

## 2024-06-14 PROBLEM — N20.0 KIDNEY STONES: Status: ACTIVE | Noted: 2024-06-14

## 2024-06-14 PROBLEM — F32.9 MAJOR DEPRESSIVE DISORDER WITH SINGLE EPISODE: Status: ACTIVE | Noted: 2024-06-14

## 2024-06-14 PROBLEM — I10 BENIGN ESSENTIAL HTN: Status: ACTIVE | Noted: 2024-06-14

## 2024-06-14 PROBLEM — R10.84 GENERALIZED ABDOMINAL PAIN: Status: ACTIVE | Noted: 2024-06-14

## 2024-06-14 PROBLEM — E03.8 OTHER SPECIFIED HYPOTHYROIDISM: Status: ACTIVE | Noted: 2024-06-14

## 2024-06-14 PROBLEM — E78.49 OTHER HYPERLIPIDEMIA: Status: ACTIVE | Noted: 2024-06-14

## 2024-06-14 PROBLEM — R53.1 WEAKNESS GENERALIZED: Status: ACTIVE | Noted: 2024-06-14

## 2024-06-14 LAB
ANION GAP SERPL CALC-SCNC: 10 MMOL/L (ref 10–20)
BUN SERPL-MCNC: 14 MG/DL (ref 6–23)
CALCIUM SERPL-MCNC: 7.9 MG/DL (ref 8.6–10.3)
CHLORIDE SERPL-SCNC: 109 MMOL/L (ref 98–107)
CO2 SERPL-SCNC: 25 MMOL/L (ref 21–32)
CREAT SERPL-MCNC: 0.56 MG/DL (ref 0.5–1.05)
EGFRCR SERPLBLD CKD-EPI 2021: >90 ML/MIN/1.73M*2
ERYTHROCYTE [DISTWIDTH] IN BLOOD BY AUTOMATED COUNT: 14.6 % (ref 11.5–14.5)
GLUCOSE SERPL-MCNC: 108 MG/DL (ref 74–99)
HCT VFR BLD AUTO: 37.1 % (ref 36–46)
HGB BLD-MCNC: 12 G/DL (ref 12–16)
HOLD SPECIMEN: NORMAL
MCH RBC QN AUTO: 28.1 PG (ref 26–34)
MCHC RBC AUTO-ENTMCNC: 32.3 G/DL (ref 32–36)
MCV RBC AUTO: 87 FL (ref 80–100)
NRBC BLD-RTO: 0 /100 WBCS (ref 0–0)
PLATELET # BLD AUTO: 123 X10*3/UL (ref 150–450)
POTASSIUM SERPL-SCNC: 3.5 MMOL/L (ref 3.5–5.3)
PROCALCITONIN SERPL-MCNC: 0.02 NG/ML
RBC # BLD AUTO: 4.27 X10*6/UL (ref 4–5.2)
SODIUM SERPL-SCNC: 140 MMOL/L (ref 136–145)
WBC # BLD AUTO: 6.2 X10*3/UL (ref 4.4–11.3)

## 2024-06-14 PROCEDURE — 80048 BASIC METABOLIC PNL TOTAL CA: CPT

## 2024-06-14 PROCEDURE — 94760 N-INVAS EAR/PLS OXIMETRY 1: CPT | Mod: IPSPLIT

## 2024-06-14 PROCEDURE — 99222 1ST HOSP IP/OBS MODERATE 55: CPT | Performed by: NURSE PRACTITIONER

## 2024-06-14 PROCEDURE — 96375 TX/PRO/DX INJ NEW DRUG ADDON: CPT | Mod: IPSPLIT

## 2024-06-14 PROCEDURE — 2500000002 HC RX 250 W HCPCS SELF ADMINISTERED DRUGS (ALT 637 FOR MEDICARE OP, ALT 636 FOR OP/ED)

## 2024-06-14 PROCEDURE — 2500000002 HC RX 250 W HCPCS SELF ADMINISTERED DRUGS (ALT 637 FOR MEDICARE OP, ALT 636 FOR OP/ED): Mod: IPSPLIT | Performed by: NURSE PRACTITIONER

## 2024-06-14 PROCEDURE — 76770 US EXAM ABDO BACK WALL COMP: CPT

## 2024-06-14 PROCEDURE — 2500000004 HC RX 250 GENERAL PHARMACY W/ HCPCS (ALT 636 FOR OP/ED)

## 2024-06-14 PROCEDURE — 9420000001 HC RT PATIENT EDUCATION 5 MIN: Mod: IPSPLIT

## 2024-06-14 PROCEDURE — 97161 PT EVAL LOW COMPLEX 20 MIN: CPT | Mod: GP,IPSPLIT

## 2024-06-14 PROCEDURE — 1100000001 HC PRIVATE ROOM DAILY: Mod: IPSPLIT

## 2024-06-14 PROCEDURE — 85027 COMPLETE CBC AUTOMATED: CPT

## 2024-06-14 PROCEDURE — 76770 US EXAM ABDO BACK WALL COMP: CPT | Performed by: RADIOLOGY

## 2024-06-14 PROCEDURE — 2500000004 HC RX 250 GENERAL PHARMACY W/ HCPCS (ALT 636 FOR OP/ED): Mod: IPSPLIT | Performed by: INTERNAL MEDICINE

## 2024-06-14 PROCEDURE — G0378 HOSPITAL OBSERVATION PER HR: HCPCS

## 2024-06-14 PROCEDURE — 2500000004 HC RX 250 GENERAL PHARMACY W/ HCPCS (ALT 636 FOR OP/ED): Performed by: NURSE PRACTITIONER

## 2024-06-14 PROCEDURE — 94664 DEMO&/EVAL PT USE INHALER: CPT

## 2024-06-14 PROCEDURE — 36415 COLL VENOUS BLD VENIPUNCTURE: CPT

## 2024-06-14 PROCEDURE — 2500000001 HC RX 250 WO HCPCS SELF ADMINISTERED DRUGS (ALT 637 FOR MEDICARE OP)

## 2024-06-14 PROCEDURE — 94640 AIRWAY INHALATION TREATMENT: CPT | Mod: IPSPLIT

## 2024-06-14 PROCEDURE — 2500000001 HC RX 250 WO HCPCS SELF ADMINISTERED DRUGS (ALT 637 FOR MEDICARE OP): Mod: IPSPLIT | Performed by: NURSE PRACTITIONER

## 2024-06-14 RX ORDER — GUAIFENESIN 600 MG/1
600 TABLET, EXTENDED RELEASE ORAL 2 TIMES DAILY
Status: DISCONTINUED | OUTPATIENT
Start: 2024-06-14 | End: 2024-06-15 | Stop reason: HOSPADM

## 2024-06-14 RX ORDER — PREDNISONE 20 MG/1
40 TABLET ORAL DAILY
Status: DISCONTINUED | OUTPATIENT
Start: 2024-06-15 | End: 2024-06-15 | Stop reason: HOSPADM

## 2024-06-14 RX ORDER — PROBENECID AND COLCHICINE .5; 5 MG/1; MG/1
1 TABLET ORAL DAILY
Status: DISCONTINUED | OUTPATIENT
Start: 2024-06-14 | End: 2024-06-14

## 2024-06-14 RX ORDER — ATORVASTATIN CALCIUM 10 MG/1
20 TABLET, FILM COATED ORAL NIGHTLY
Status: DISCONTINUED | OUTPATIENT
Start: 2024-06-14 | End: 2024-06-15 | Stop reason: HOSPADM

## 2024-06-14 RX ORDER — LIOTHYRONINE SODIUM 5 UG/1
10 TABLET ORAL 2 TIMES DAILY
Status: DISCONTINUED | OUTPATIENT
Start: 2024-06-14 | End: 2024-06-15 | Stop reason: HOSPADM

## 2024-06-14 RX ORDER — SERTRALINE HYDROCHLORIDE 50 MG/1
50 TABLET, FILM COATED ORAL DAILY
Status: DISCONTINUED | OUTPATIENT
Start: 2024-06-14 | End: 2024-06-15 | Stop reason: HOSPADM

## 2024-06-14 RX ORDER — FLUTICASONE PROPIONATE 50 MCG
2 SPRAY, SUSPENSION (ML) NASAL DAILY
Status: DISCONTINUED | OUTPATIENT
Start: 2024-06-14 | End: 2024-06-15 | Stop reason: HOSPADM

## 2024-06-14 RX ORDER — LEVOTHYROXINE SODIUM 88 UG/1
88 TABLET ORAL DAILY
Status: DISCONTINUED | OUTPATIENT
Start: 2024-06-14 | End: 2024-06-15 | Stop reason: HOSPADM

## 2024-06-14 RX ORDER — COLCHICINE 0.6 MG/1
0.6 TABLET ORAL DAILY
Status: DISCONTINUED | OUTPATIENT
Start: 2024-06-14 | End: 2024-06-14

## 2024-06-14 RX ORDER — PROBENECID 500 MG/1
500 TABLET, FILM COATED ORAL DAILY
Status: DISCONTINUED | OUTPATIENT
Start: 2024-06-14 | End: 2024-06-14

## 2024-06-14 RX ORDER — SODIUM CHLORIDE 9 MG/ML
10 INJECTION, SOLUTION INTRAVENOUS CONTINUOUS PRN
Status: DISCONTINUED | OUTPATIENT
Start: 2024-06-14 | End: 2024-06-15 | Stop reason: HOSPADM

## 2024-06-14 RX ORDER — TALC
3 POWDER (GRAM) TOPICAL NIGHTLY PRN
Status: DISCONTINUED | OUTPATIENT
Start: 2024-06-14 | End: 2024-06-15 | Stop reason: HOSPADM

## 2024-06-14 RX ADMIN — GUAIFENESIN 600 MG: 600 TABLET, EXTENDED RELEASE ORAL at 04:57

## 2024-06-14 RX ADMIN — METHYLPREDNISOLONE SODIUM SUCCINATE 40 MG: 40 INJECTION, POWDER, FOR SOLUTION INTRAMUSCULAR; INTRAVENOUS at 05:18

## 2024-06-14 RX ADMIN — IPRATROPIUM BROMIDE AND ALBUTEROL SULFATE 3 ML: .5; 3 SOLUTION RESPIRATORY (INHALATION) at 09:22

## 2024-06-14 RX ADMIN — ACETAMINOPHEN 650 MG: 325 TABLET ORAL at 20:04

## 2024-06-14 RX ADMIN — ATORVASTATIN CALCIUM 20 MG: 10 TABLET, FILM COATED ORAL at 20:04

## 2024-06-14 RX ADMIN — IPRATROPIUM BROMIDE AND ALBUTEROL SULFATE 3 ML: .5; 3 SOLUTION RESPIRATORY (INHALATION) at 15:21

## 2024-06-14 RX ADMIN — LEVOTHYROXINE SODIUM 88 MCG: 88 TABLET ORAL at 06:01

## 2024-06-14 RX ADMIN — SERTRALINE HYDROCHLORIDE 50 MG: 50 TABLET ORAL at 10:19

## 2024-06-14 RX ADMIN — PANTOPRAZOLE SODIUM 40 MG: 40 TABLET, DELAYED RELEASE ORAL at 06:01

## 2024-06-14 RX ADMIN — GUAIFENESIN 600 MG: 600 TABLET, EXTENDED RELEASE ORAL at 20:04

## 2024-06-14 RX ADMIN — ACETAMINOPHEN 650 MG: 325 TABLET ORAL at 12:12

## 2024-06-14 RX ADMIN — FLUTICASONE PROPIONATE 2 SPRAY: 50 SPRAY, METERED NASAL at 10:20

## 2024-06-14 RX ADMIN — SODIUM CHLORIDE 10 ML/HR: 9 INJECTION, SOLUTION INTRAVENOUS at 12:13

## 2024-06-14 RX ADMIN — CEFTRIAXONE 2 G: 2 INJECTION, SOLUTION INTRAVENOUS at 10:19

## 2024-06-14 RX ADMIN — AZITHROMYCIN MONOHYDRATE 500 MG: 500 INJECTION, POWDER, LYOPHILIZED, FOR SOLUTION INTRAVENOUS at 10:28

## 2024-06-14 RX ADMIN — ENOXAPARIN SODIUM 40 MG: 40 INJECTION SUBCUTANEOUS at 18:12

## 2024-06-14 ASSESSMENT — PAIN SCALES - GENERAL
PAINLEVEL_OUTOF10: 0 - NO PAIN
PAINLEVEL_OUTOF10: 4
PAINLEVEL_OUTOF10: 3
PAINLEVEL_OUTOF10: 1
PAINLEVEL_OUTOF10: 5 - MODERATE PAIN

## 2024-06-14 ASSESSMENT — COGNITIVE AND FUNCTIONAL STATUS - GENERAL
MOBILITY SCORE: 20
WALKING IN HOSPITAL ROOM: A LITTLE
MOVING TO AND FROM BED TO CHAIR: A LITTLE
CLIMB 3 TO 5 STEPS WITH RAILING: A LOT

## 2024-06-14 ASSESSMENT — ENCOUNTER SYMPTOMS
SHORTNESS OF BREATH: 1
COUGH: 1

## 2024-06-14 ASSESSMENT — PAIN - FUNCTIONAL ASSESSMENT
PAIN_FUNCTIONAL_ASSESSMENT: 0-10

## 2024-06-14 ASSESSMENT — PAIN DESCRIPTION - LOCATION: LOCATION: THROAT

## 2024-06-14 NOTE — PROGRESS NOTES
06/14/24 1251   Discharge Planning   Living Arrangements Spouse/significant other   Support Systems Spouse/significant other   Assistance Needed independent in ADLs IADLs   Type of Residence Private residence   Home or Post Acute Services None   Patient expects to be discharged to: Home   Does the patient need discharge transport arranged? No     Met with patient at bedside to discuss discharge planning, PT/OT to eval, patient states that she does not want HHC nor go to a SNF if therapy recommends, may agree to outpatient therapy, patient states she does exercise at home. Patient not medically ready for discharge. ADOD 2-3 days. Oncoming TCC to follow through hospital course for any changes in discharge plan

## 2024-06-14 NOTE — CARE PLAN
Bgl 504-orders to administer 8 units Humalog and to notify MD. Dr. Sebas Gracia notified and orders received to administer a total of 20 units Humalog.  One time dose of 12 units Humalog ordered per Dr. Sebas Gracia The patient's goals for the shift include  to have headache better    The clinical goals for the shift include Pulse ox will be >90% today on room air.    Pt up in room. Had headache and got tylenol with relief. She c/o sore throat also. Appetite good. Has non-prod cough. Alert and oriented.

## 2024-06-14 NOTE — PROGRESS NOTES
Wendi Chan is a 68 y.o. female on day 0 of admission presenting with Community acquired bacterial pneumonia.      Subjective   Pt assessed at bedside, sitting on the edge of the bed with her . Pt states she just feels weak. She had chills the past few days. She denies any chest pain or shortness of breath. She reports a cough productive of thick green sputum.        Objective     Last Recorded Vitals  /89 (BP Location: Right arm, Patient Position: Lying)   Pulse 94   Temp 37.9 °C (100.2 °F) (Temporal)   Resp 19   Wt 54.6 kg (120 lb 6.4 oz)   SpO2 93%   Intake/Output last 3 Shifts:    Intake/Output Summary (Last 24 hours) at 6/14/2024 1108  Last data filed at 6/14/2024 1049  Gross per 24 hour   Intake 1220 ml   Output --   Net 1220 ml       Admission Weight  Weight: 54 kg (119 lb) (06/13/24 1338)    Daily Weight  06/13/24 : 54.6 kg (120 lb 6.4 oz)    Image Results  US renal complete  Narrative: Interpreted By:  Lencho Jeffries,   STUDY:  US RENAL COMPLETE;  6/14/2024 8:55 am      INDICATION:  Signs/Symptoms:Patient endorses right lower quadrant pain that  radiates into the right flank.  Patient does have history of kidney  stones.  Patient states it feels similar to when she has had kidney  stones in the past..      COMPARISON:  06/13/2024 enhanced and 10/13/2021 unenhanced abdominal and pelvic  CTs.      ACCESSION NUMBER(S):  [id number]      ORDERING CLINICIAN:  NAGA SHIRLEY      TECHNIQUE:  Multiple images of the kidneys were obtained  .      FINDINGS:  RIGHT KIDNEY:  The right kidney measures 10.4 cm in length. The renal cortical  echogenicity and thickness arewithin normal limits. Subcentimeter  hypoattenuating parenchymal lesions likely to be cysts demonstrated  on yesterday's enhanced CT are too small to appreciate. A 2-5 mm  upper renal sinus echogenic focus is presumably one of the very small  right renal calculi best demonstrated on the 10/21 unenhanced CT.  There is no hydronephrosis  or evidence of a renal mass.      LEFT KIDNEY:  The left kidney measures 10.5 cm in length. The renal cortical  echogenicity and thickness arewithin normal limits. A 9 mm anterior  cortical cyst and anteromedial lower pole parenchymal thinning  demonstrated on yesterday's enhanced CT can not be appreciated. A 3  mm inferior renal sinus echogenic focus corresponds to a similar size  calcified stone on the 10/21 unenhanced CT. There is no  hydronephrosis or evidence of a renal mass.      An 18 x 13 x 17 mm oval hypoechoic focus with mild far acoustic  enhancement lateral to the left kidney is presumably a similar size  splenule on yesterday's CT, which shows no other corresponding  finding.      BLADDER:  Collapsed and can not be evaluated.      Impression: Bilateral nonobstructing renal calculi.      MACRO:  None      Signed by: Lencho Jeffries 6/14/2024 10:45 AM  Dictation workstation:   [id number]      Physical Exam  Vitals reviewed.   Constitutional:       Appearance: She is ill-appearing.   HENT:      Head: Normocephalic and atraumatic.      Right Ear: External ear normal.      Left Ear: External ear normal.      Nose: Nose normal.      Mouth/Throat:      Pharynx: Oropharynx is clear.   Eyes:      Conjunctiva/sclera: Conjunctivae normal.   Cardiovascular:      Rate and Rhythm: Normal rate and regular rhythm.      Pulses: Normal pulses.      Heart sounds: Normal heart sounds.   Pulmonary:      Comments: crackles  Abdominal:      General: Bowel sounds are normal.      Palpations: Abdomen is soft.      Tenderness: There is abdominal tenderness.   Musculoskeletal:         General: Normal range of motion.      Cervical back: Normal range of motion and neck supple.   Skin:     General: Skin is dry.   Neurological:      General: No focal deficit present.      Mental Status: She is alert and oriented to person, place, and time.   Psychiatric:         Mood and Affect: Mood normal.         Behavior: Behavior normal.          Relevant Results  Scheduled medications  atorvastatin, 20 mg, oral, Nightly  azithromycin, 500 mg, intravenous, q24h  cefTRIAXone, 2 g, intravenous, q24h  colchicine, 0.6 mg, oral, Daily  enoxaparin, 40 mg, subcutaneous, q24h  fluticasone, 2 spray, Each Nostril, Daily  guaiFENesin, 600 mg, oral, BID  ipratropium-albuteroL, 3 mL, nebulization, TID  levothyroxine, 88 mcg, oral, Daily  liothyronine, 10 mcg, oral, BID  pantoprazole, 40 mg, oral, Daily before breakfast   Or  pantoprazole, 40 mg, intravenous, Daily before breakfast  [START ON 6/15/2024] predniSONE, 40 mg, oral, Daily  probenecid, 500 mg, oral, Daily  sertraline, 50 mg, oral, Daily      Continuous medications  sodium chloride 0.9%, 10 mL/hr      PRN medications  PRN medications: acetaminophen, albuterol, benzocaine-menthol, dextromethorphan-guaifenesin, guaiFENesin, melatonin, ondansetron **OR** ondansetron, polyethylene glycol, sodium chloride 0.9%    Results for orders placed or performed during the hospital encounter of 06/13/24 (from the past 24 hour(s))   CBC and Auto Differential   Result Value Ref Range    WBC 7.8 4.4 - 11.3 x10*3/uL    nRBC 0.0 0.0 - 0.0 /100 WBCs    RBC 4.73 4.00 - 5.20 x10*6/uL    Hemoglobin 13.5 12.0 - 16.0 g/dL    Hematocrit 41.0 36.0 - 46.0 %    MCV 87 80 - 100 fL    MCH 28.5 26.0 - 34.0 pg    MCHC 32.9 32.0 - 36.0 g/dL    RDW 14.3 11.5 - 14.5 %    Platelets 153 150 - 450 x10*3/uL    Neutrophils % 88.0 40.0 - 80.0 %    Immature Granulocytes %, Automated 0.3 0.0 - 0.9 %    Lymphocytes % 5.9 13.0 - 44.0 %    Monocytes % 5.5 2.0 - 10.0 %    Eosinophils % 0.0 0.0 - 6.0 %    Basophils % 0.3 0.0 - 2.0 %    Neutrophils Absolute 6.91 1.20 - 7.70 x10*3/uL    Immature Granulocytes Absolute, Automated 0.02 0.00 - 0.70 x10*3/uL    Lymphocytes Absolute 0.46 (L) 1.20 - 4.80 x10*3/uL    Monocytes Absolute 0.43 0.10 - 1.00 x10*3/uL    Eosinophils Absolute 0.00 0.00 - 0.70 x10*3/uL    Basophils Absolute 0.02 0.00 - 0.10 x10*3/uL    Comprehensive metabolic panel   Result Value Ref Range    Glucose 143 (H) 74 - 99 mg/dL    Sodium 137 136 - 145 mmol/L    Potassium 3.7 3.5 - 5.3 mmol/L    Chloride 101 98 - 107 mmol/L    Bicarbonate 28 21 - 32 mmol/L    Anion Gap 12 10 - 20 mmol/L    Urea Nitrogen 18 6 - 23 mg/dL    Creatinine 0.75 0.50 - 1.05 mg/dL    eGFR 86 >60 mL/min/1.73m*2    Calcium 9.0 8.6 - 10.3 mg/dL    Albumin 4.4 3.4 - 5.0 g/dL    Alkaline Phosphatase 56 33 - 136 U/L    Total Protein 7.4 6.4 - 8.2 g/dL    AST 25 9 - 39 U/L    Bilirubin, Total 0.4 0.0 - 1.2 mg/dL    ALT 20 7 - 45 U/L   B-Type Natriuretic Peptide   Result Value Ref Range    BNP 54 0 - 99 pg/mL   Magnesium   Result Value Ref Range    Magnesium 1.86 1.60 - 2.40 mg/dL   Troponin I, High Sensitivity, Initial   Result Value Ref Range    Troponin I, High Sensitivity 6 0 - 13 ng/L   Lactate   Result Value Ref Range    Lactate 1.3 0.4 - 2.0 mmol/L   Blood Gas Venous Full Panel   Result Value Ref Range    POCT pH, Venous 7.43 7.33 - 7.43 pH    POCT pCO2, Venous 43 41 - 51 mm Hg    POCT pO2, Venous 31 (L) 35 - 45 mm Hg    POCT SO2, Venous 49 45 - 75 %    POCT Oxy Hemoglobin, Venous 48.5 45.0 - 75.0 %    POCT Hematocrit Calculated, Venous 44.0 36.0 - 46.0 %    POCT Sodium, Venous 135 (L) 136 - 145 mmol/L    POCT Potassium, Venous 3.9 3.5 - 5.3 mmol/L    POCT Chloride, Venous 100 98 - 107 mmol/L    POCT Ionized Calicum, Venous 1.10 1.10 - 1.33 mmol/L    POCT Glucose, Venous 148 (H) 74 - 99 mg/dL    POCT Lactate, Venous 1.4 0.4 - 2.0 mmol/L    POCT Base Excess, Venous 3.6 (H) -2.0 - 3.0 mmol/L    POCT HCO3 Calculated, Venous 28.5 (H) 22.0 - 26.0 mmol/L    POCT Hemoglobin, Venous 14.5 12.0 - 16.0 g/dL    POCT Anion Gap, Venous 10.0 10.0 - 25.0 mmol/L    Patient Temperature      FiO2 21 %   Lipase   Result Value Ref Range    Lipase 13 9 - 82 U/L   Blood Culture    Specimen: Central Line/Catheter (Specify below); Blood culture   Result Value Ref Range    Blood Culture Loaded on  Instrument - Culture in progress    ECG 12 lead   Result Value Ref Range    Ventricular Rate 112 BPM    Atrial Rate 112 BPM    TX Interval 162 ms    QRS Duration 90 ms    QT Interval 350 ms    QTC Calculation(Bazett) 477 ms    P Axis 77 degrees    R Axis 35 degrees    T Axis 62 degrees    QRS Count 18 beats    Q Onset 223 ms    P Onset 142 ms    P Offset 193 ms    T Offset 398 ms    QTC Fredericia 431 ms   Influenza A, and B PCR   Result Value Ref Range    Flu A Result Not Detected Not Detected    Flu B Result Not Detected Not Detected   Sars-CoV-2 PCR   Result Value Ref Range    Coronavirus 2019, PCR Not Detected Not Detected   RSV PCR   Result Value Ref Range    RSV PCR Not Detected Not Detected   Troponin, High Sensitivity, 1 Hour   Result Value Ref Range    Troponin I, High Sensitivity 7 0 - 13 ng/L   Urinalysis with Reflex Culture and Microscopic   Result Value Ref Range    Color, Urine Light-Yellow Light-Yellow, Yellow, Dark-Yellow    Appearance, Urine Clear Clear    Specific Gravity, Urine >1.050 (N) 1.005 - 1.035    pH, Urine 7.0 5.0, 5.5, 6.0, 6.5, 7.0, 7.5, 8.0    Protein, Urine 30 (1+) (A) NEGATIVE, 10 (TRACE), 20 (TRACE) mg/dL    Glucose, Urine Normal Normal mg/dL    Blood, Urine 0.2 (2+) (A) NEGATIVE    Ketones, Urine TRACE (A) NEGATIVE mg/dL    Bilirubin, Urine NEGATIVE NEGATIVE    Urobilinogen, Urine Normal Normal mg/dL    Nitrite, Urine NEGATIVE NEGATIVE    Leukocyte Esterase, Urine NEGATIVE NEGATIVE   Extra Urine Gray Tube   Result Value Ref Range    Extra Tube Hold for add-ons.    Urinalysis Microscopic   Result Value Ref Range    WBC, Urine 1-5 1-5, NONE /HPF    RBC, Urine >20 (A) NONE, 1-2, 3-5 /HPF    Squamous Epithelial Cells, Urine 1-9 (SPARSE) Reference range not established. /HPF    Mucus, Urine 1+ Reference range not established. /LPF   Blood Culture    Specimen: Peripheral Venipuncture; Blood culture   Result Value Ref Range    Blood Culture Loaded on Instrument - Culture in progress     Procalcitonin   Result Value Ref Range    Procalcitonin 0.02 <=0.07 ng/mL   CBC   Result Value Ref Range    WBC 6.2 4.4 - 11.3 x10*3/uL    nRBC 0.0 0.0 - 0.0 /100 WBCs    RBC 4.27 4.00 - 5.20 x10*6/uL    Hemoglobin 12.0 12.0 - 16.0 g/dL    Hematocrit 37.1 36.0 - 46.0 %    MCV 87 80 - 100 fL    MCH 28.1 26.0 - 34.0 pg    MCHC 32.3 32.0 - 36.0 g/dL    RDW 14.6 (H) 11.5 - 14.5 %    Platelets 123 (L) 150 - 450 x10*3/uL   Basic metabolic panel   Result Value Ref Range    Glucose 108 (H) 74 - 99 mg/dL    Sodium 140 136 - 145 mmol/L    Potassium 3.5 3.5 - 5.3 mmol/L    Chloride 109 (H) 98 - 107 mmol/L    Bicarbonate 25 21 - 32 mmol/L    Anion Gap 10 10 - 20 mmol/L    Urea Nitrogen 14 6 - 23 mg/dL    Creatinine 0.56 0.50 - 1.05 mg/dL    eGFR >90 >60 mL/min/1.73m*2    Calcium 7.9 (L) 8.6 - 10.3 mg/dL       Assessment/Plan        Principal Problem:    Community acquired bacterial pneumonia  Active Problems:    Generalized abdominal pain    Kidney stones    Weakness generalized    Benign essential HTN    Other hyperlipidemia    Major depressive disorder with single episode    Other specified hypothyroidism    #Pneumonia  -CT chest: No CT evidence of pulmonary embolism in the current exam.  Mild emphysema with upper lobe predominance. Band of linear scarring  versus less likely atelectasis in the medial left lung apex.  Findings of prior granulomatous disease as described.  Stable small grouping of reticulonodular densities in the  posterolateral left lower lobe consistent with sequela of remote  infection or inflammation.  There are several nonspecific, noncalcified right lower lobe  pulmonary nodules which were not clearly evident on the previous CT  scans of the abdomen and pelvis measuring 11 mm in diameter or less.  There is also a small inflammatory appearing 6 mm left lower lobe  lung nodule which was not evident previously. These could be foci of  inflammation or infection. These could be development of  interval  scars. Tumor is not excluded. Consider either a follow-up CT scan in  3-6 months, or a PET-CT scan in follow-up.  Mild nonspecific distention of the mid esophagus with gas and  ingested material. Consider follow-up with upper endoscopy or barium  esophagram.  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  -Discussed CT findings and the importance of follow up with patient and spouse  -WBC 7.8  -Covid/flu/rsv negative  -Blood cx pending   -Sputum cx pending   -Procal 0.02  -Stable on RA  -IV azithro, ceftriaxone (Day 1)  -Mucinex, fluticasone   -Bronchodilators  -PO prednisone x5 days     #Abdominal pain  #Hx of kidney stones  -CT abd/pelvis: Mild nonspecific free pelvic fluid.  Retroflexed uterus with small uterine fibroids. Nonobstructing lower pole left renal stone. No hydronephrosis on either side. Small bilateral renal cysts. Interval right hip arthroplasty.  Arthritic changes in the lumbosacral spine as described.  -Renal US: Bilateral nonobstructing renal calculi.   -UA: 2+blood  -Will need follow up UA with PCP    #Generalized weakness  -Safety measures  -PT/OT evals     #Essential HTN  #HLD  -BP stable  -Continue atorvastatin  -Monitor BP and HR    #Depression  -Continue sertaline    #Hypothyroidism  -Continue levothyroxine     DVT ppx  -lovenox    PUD ppx  -pantoprazole    F: PRN  E: Replete per protocol  N: Regular  A: PIV    Disposition: Pt requires more than 2 inpatient days at this time   Code Status: Full Code    Total accumulated time spent face to face and not face to face preparing to see the patient, obtaining and reviewing separately obtained history; performing a medically appropriate examination and/or evaluation; counseling and educating the patient, family; ordering medications, tests, or procedures; referring and communicating with other health care professionals; documenting clinical information in the patient's medical record;  independently interpreting results and communicating the results to the patient, family; and care coordination was 45 minutes.         *Patient seen and case discussed with Dr. Annette Hendricks, DARRYL-CNP

## 2024-06-14 NOTE — CARE PLAN
The patient's goals for the shift include      The clinical goals for the shift include Rest throughout the night    The patient had a restful evening with no complaints, vss.

## 2024-06-14 NOTE — PROGRESS NOTES
Occupational Therapy  OT Screen/Therapy Communication Note    Patient Name: Wendi Chan  MRN: [id number]  Today's Date: 6/14/2024     Discipline: Occupational Therapy    Missed Visit Reason: Missed Visit Reason: Other (Comment) (OT screen completed. Pt. displayed supervision with ambulation with FWW, IND LB dressing, transfers, and balance. Pt. reports she is at her baseline with ADL tasks and  declines OT needs. OT agreed. Discussed potential hip weakness wtih PT per pt request)    Comment: OT screen [id number]

## 2024-06-14 NOTE — PROGRESS NOTES
Physical Therapy    Physical Therapy Evaluation    Patient Name: Wendi Chan  MRN: [id number]  Today's Date: 6/14/2024   Time Calculation  Start Time: 1440  Stop Time: 1455  Time Calculation (min): 15 min    Assessment/Plan   PT Assessment  PT Assessment Results: Decreased strength, Impaired balance, Decreased mobility  Rehab Prognosis: Fair  Barriers to Discharge: none at this time  Evaluation/Treatment Tolerance: Patient tolerated treatment well  Medical Staff Made Aware: Yes  Strengths: Housing layout, Support and attitude of living partners  Barriers to Participation: Comorbidities  End of Session Communication: Bedside nurse  Assessment Comment: Pt is a 67 y/o F seen for PT evaluation following admission for pneumonia. Pt is  demonstrating deficits in strength, balance, and general mobility. Pt requires skilled PT intervention to address deficits and prevent decline while hospitalized. Pt is weaker than at baseline function, and this PT recommends low intensity rehab following DC to ensure safety w home mobility.  End of Session Patient Position: Alarm on, Bed, 2 rail up  IP OR SWING BED PT PLAN  Inpatient or Swing Bed: Inpatient  PT Plan  Treatment/Interventions: Gait training, Stair training, Balance training, Neuromuscular re-education, Therapeutic exercise, Therapeutic activity, Strengthening  PT Plan: Ongoing PT  PT Frequency: 2 times per week  PT Discharge Recommendations: Low intensity level of continued care  Equipment Recommended upon Discharge:  (none, pt owns FWW and cane)  PT Recommended Transfer Status: Stand by assist  PT - OK to Discharge: Yes Based on completed evaluation and care plan recommendations, no barriers to discharge to next site of care     Subjective   General Visit Information:  General  Reason for Referral: impaired mobility and gait difficulty  Referred By: DARRYL Gomez-CNP  Past Medical History Relevant to Rehab: PMHx includes Breast cancer, Depression, Hypercholesteremia,  Hypothyroidism, Osteoporosis, R hip fx  Prior to Session Communication: Bedside nurse, PCT/NA/CTA  General Comment: Pt pleasant and agreeable to participate  Home Living:  Home Living  Type of Home: [place]  Lives With: Spouse  Home Adaptive Equipment: Walker rolling or standard, Cane (states she does not use)  Home Layout: Two level  Home Access: Stairs to enter without rails  Entrance Stairs-Rails: None  Entrance Stairs-Number of Steps: 1  Bathroom Shower/Tub: Tub/shower unit, Walk-in shower (2 different bathroom)  Bathroom Toilet: Standard  Bathroom Equipment: Grab bars in shower  Home Living Comments: regular bed  Prior Level of Function:  Prior Function Per Pt/Caregiver Report  Level of [place]: Independent with ADLs and functional transfers, Independent with homemaking with ambulation  Precautions:  Precautions  Medical Precautions: Fall precautions  Precautions Comment: no BP in L arm  Vital Signs:  Vital Signs  Heart Rate: 85  Heart Rate Source: Monitor  SpO2: 94 %  BP: 112/64  BP Location: Right arm  BP Method: Automatic  Patient Position: Lying    Objective   Pain:  Pain Assessment  Pain Assessment: 0-10  Pain Score: 0 - No pain  Cognition:  Cognition  Overall Cognitive Status: Within Functional Limits    General Assessments:     Activity Tolerance  Endurance: Tolerates 10 - 20 min exercise with multiple rests    Sensation  Sensation Comment: reporting old deficits in Bob toes. No new symptoms    Coordination  Movements are Fluid and Coordinated: Yes    Postural Control  Postural Control: Within Functional Limits    Static Sitting Balance  Static Sitting-Balance Support: Feet unsupported, No upper extremity supported  Static Sitting-Level of Assistance: Distant supervision  Static Sitting-Comment/Number of Minutes: mult min sitting EOB. no LOB noted.    Static Standing Balance  Static Standing-Balance Support: No upper extremity supported  Static Standing-Level of Assistance: Distant supervision  Static  Standing-Comment/Number of Minutes: mult min static stance. No LOB and pt fairly steady  Functional Assessments:  Bed Mobility  Bed Mobility: Yes  Bed Mobility 1  Bed Mobility 1: Supine to sitting, Sitting to supine, Scooting  Level of Assistance 1: Distant supervision  Bed Mobility Comments 1: needing increased time to complete - slow to move    Transfers  Transfer: Yes  Transfer 1  Transfer From 1: Bed to  Transfer to 1: Stand, Sit  Technique 1: Stand to sit, Sit to stand  Transfer Device 1: Gait belt  Transfer Level of Assistance 1: Distant supervision  Trials/Comments 1: good hand placement, slow to move but overall steady  Transfers 2  Transfer From 2: Chair with arms to  Transfer to 2: Stand, Sit  Technique 2: Stand to sit, Sit to stand  Transfer Device 2: Gait belt  Transfer Level of Assistance 2: Distant supervision  Trials/Comments 2: similar to above transfer    Ambulation/Gait Training  Ambulation/Gait Training Performed: Yes  Ambulation/Gait Training 1  Surface 1: Level tile  Device 1: No device  Gait Support Devices: Gait belt  Assistance 1: Contact guard  Quality of Gait 1: Narrow base of support, Inconsistent stride length, Decreased step length (slow jennifer)  Comments/Distance (ft) 1: 1x50 w no device. Pt amb w step to pattern on RLE, pt states this is d/t previous hip fx. Pt demo decreased overall foot clearance and step length. No major LOB but minor unsteadiness noted. 1x10 w FWW and similar deviations but improved steadiness/balance.  Extremity/Trunk Assessments:  RUE   RUE : Within Functional Limits  LUE   LUE: Within Functional Limits  RLE   RLE :  (grossly 4-/5. Pt reporting decreases in strength since being hospitalized.)  LLE   LLE :  (grossly 4-/5. Pt reporting decreases in strength since being hospitalized.)  Outcome Measures:  [facility] Basic Mobility  Turning from your back to your side while in a flat bed without using bedrails: None  Moving from lying on your back to sitting on the side  of a flat bed without using bedrails: None  Moving to and from bed to chair (including a wheelchair): A little  Standing up from a chair using your arms (e.g. wheelchair or bedside chair): None  To walk in hospital room: A little  Climbing 3-5 steps with railing: A lot  Basic Mobility - Total Score: 20    Encounter Problems       Encounter Problems (Active)       PT Problem       pt will be able to ambulate community distances with LRAD and modified independent        Start:  06/14/24    Expected End:  06/28/24            Pt will be able to perform STS transfer with independent        Start:  06/14/24    Expected End:  06/28/24            Pt will be able to navigate 1 step without hand rail and modified independent to allow for safe DC.         Start:  06/14/24    Expected End:  06/28/24            Pt will be able to navigate 12 steps with hand rail on the left side(s) and supervision to allow for safe DC.         Start:  06/14/24    Expected End:  06/28/24            Pt will be able to  object from standing position w/o device and w supervision       Start:  06/14/24    Expected End:  06/28/24                   Pain - Adult              Education Documentation  Mobility Training, taught by Arleen Simmons PT at 6/14/2024  3:18 PM.  Learner: Patient  Readiness: Acceptance  Method: Explanation  Response: Verbalizes Understanding, Demonstrated Understanding, Needs Reinforcement  Comment: Pt edu on role of PT and POC. Pt also edu on importance of mobility and safety w AD.    Education Comments  No comments found.

## 2024-06-14 NOTE — H&P
History and Physical         Wendi Chan 68 y.o. 1955     History Of Present Illness  Wendi Chan is a 68 y.o. female presented to [facility] from home. Chief complaint of worsening general  malaise related  to  cough and congestion for past three days. Pt states that for the last week there has had more trouble breathing, and patient  reports  unproductive coughing. In ED WBC 7.8   Lactate 1.3   COVID Flu RSV Negative  No CT evidence of pulmonary embolism in the current exam.   Mild emphysema with upper lobe predominance. Band of linear scarring versus less likely atelectasis in the medial left lung apex.   Findings of prior granulomatous disease as described.   Stable small grouping of reticulonodular densities in the posterolateral left lower lobe consistent with sequela of remote infection or inflammation.  Patient was being treated in ED with plan to discharge home when she developed increased weakness and inability to  ambulate. Patient c/o  right flank pain that she stated was similar to pmh of renal  calculi. On Exam patient  sitting on side of bed. She has diminished breath sounds on auscultation, mild crackles in bilateral fields, no wheezing or distress, conversational dyspnea, thorax symmetric, SpO2 92% on  RA No accessory muscle use. No tachypnea.  Patient c/o right flank pain radiating to her abdomen/  Patient denies Chest Pain, N/V/D               Past Medical History  Past Medical History:   Diagnosis Date    Breast cancer in female (Multi)     Depression     Hypercholesteremia     Hypothyroidism     Osteoporosis         Surgical History  She has a past surgical history that includes Other surgical history (11/02/2020); Fracture surgery; Wrist fracture surgery; and Hip fracture surgery.     Social History  Social History     Socioeconomic History    Marital status: Single     Spouse name: Not on file    Number of children: Not on file    Years of education: Not on file     Highest education level: Not on file   Occupational History    Not on file   Tobacco Use    Smoking status: Former     Types: Cigarettes    Smokeless tobacco: Never   Substance and Sexual Activity    Alcohol use: Never    Drug use: Never    Sexual activity: Not on file   Other Topics Concern    Not on file   Social History Narrative    Not on file     Social Determinants of Health     Financial Resource Strain: Low Risk  (6/13/2024)    Overall Financial Resource Strain (CARDIA)     Difficulty of Paying Living Expenses: Not hard at all   Food Insecurity: No Food Insecurity (11/29/2022)    Received from [facility]    Hunger Vital Sign     Worried About Running Out of Food in the Last Year: Never true     Ran Out of Food in the Last Year: Never true   Transportation Needs: No Transportation Needs (6/13/2024)    PRAPARE - Transportation     Lack of Transportation (Medical): No     Lack of Transportation (Non-Medical): No   Physical Activity: Sufficiently Active (11/29/2022)    Received from [facility]    Exercise Vital Sign     Days of Exercise per Week: 7 days     Minutes of Exercise per Session: 120 min   Stress: No Stress Concern Present (11/29/2022)    Received from [facility]    [employer] - Occupational Stress Questionnaire     Feeling of Stress : Only a little   Social Connections: Socially Integrated (11/29/2022)    Received from [facility]    Social Connection and Isolation Panel [NHANES]     Frequency of Communication with Friends and Family: More than three times a week     Frequency of Social Gatherings with Friends and Family: Once a week     Attends Restoration Services: More than 4 times per year     Active Member of Clubs or Organizations: Yes     Attends Club or Organization Meetings: More than 4 times per year     Marital Status:    Intimate Partner Violence: Not on file   Housing Stability: Low Risk  (6/13/2024)    Housing Stability Vital Sign      Unable to Pay for Housing in the Last Year: No     Number of Places Lived in the Last Year: 1     Unstable Housing in the Last Year: No        Family History  No family history on file.     Allergies  Allergies   Allergen Reactions    Allerg Ext-Tall Ragweed Pollen Itching    Lactose Unknown    Sulfamethoxazole-Trimethoprim Nausea/vomiting        Vital Signs  Temp:  [36.9 °C (98.4 °F)-37.8 °C (100.1 °F)] 36.9 °C (98.4 °F)  Heart Rate:  [101-128] 103  Resp:  [16-27] 19  BP: (115-147)/(73-96) 122/74  FiO2 (%):  [21 %] 21 %    Home Medications   Prior to Admission Medications   Prescriptions Last Dose Informant Patient Reported? Taking?   atorvastatin (Lipitor) 20 mg tablet 6/12/2024  Yes Yes   Sig: Take 1 tablet (20 mg) by mouth once daily at bedtime.   denosumab (Prolia) 60 mg/mL syringe Past Month  Yes Yes   Sig: Inject 1 mL (60 mg total) under the skin every 6 months.   levothyroxine (Synthroid, Levoxyl) 75 mcg tablet 6/12/2024  Yes Yes   Sig: Take 88 mcg by mouth early in the morning.. Take on an empty stomach at the same time each day, either 30 to 60 minutes prior to breakfast   liothyronine (Cytomel) 5 mcg tablet 6/12/2024  Yes Yes   Sig: Take 2 tablets (10 mcg) by mouth 2 times a day.   melatonin 1 mg tablet,chewable 6/12/2024  Yes Yes   Sig: Chew.   probenecid-colchicine 500-0.5 mg tablet 6/12/2024  Yes Yes   Sig: Take 1 tablet by mouth once daily.   sertraline (Zoloft) 50 mg tablet 6/12/2024  Yes Yes   Sig: Take 1 tablet (50 mg) by mouth once daily.      Facility-Administered Medications: None       New Hospital Orders  Current Facility-Administered Medications   Medication Dose Route Frequency Provider Last Rate Last Admin    acetaminophen (Tylenol) tablet 650 mg  650 mg oral q4h PRN REUBEN Gomez   650 mg at 06/13/24 2020    albuterol 2.5 mg /3 mL (0.083 %) nebulizer solution 2.5 mg  2.5 mg nebulization q2h PRN REUBEN Alberts        azithromycin (Zithromax) in dextrose 5 % in water  (D5W) 250 mL  mg  500 mg intravenous q24h REUBEN Gomez        benzocaine-menthol (Cepastat Sore Throat) lozenge 1 lozenge  1 lozenge Mouth/Throat q2h PRN REUBEN Gomez   1 lozenge at 06/13/24 2031    cefTRIAXone (Rocephin) 2 g in dextrose 5% in water (D5W) 50 mL  2 g intravenous q24h REUBEN Gomez        dextromethorphan-guaifenesin (Robitussin DM)  mg/5 mL oral liquid 5 mL  5 mL oral q4h PRN REUBEN Gomez        enoxaparin (Lovenox) syringe 40 mg  40 mg subcutaneous q24h REUBEN Gomez   40 mg at 06/13/24 2020    guaiFENesin (Mucinex) 12 hr tablet 600 mg  600 mg oral q12h PRN REUBEN Gomez        ipratropium-albuteroL (Duo-Neb) 0.5-2.5 mg/3 mL nebulizer solution 3 mL  3 mL nebulization TID REUBEN Alberts   3 mL at 06/13/24 2239    melatonin tablet 9 mg  9 mg oral Nightly PRN REUBEN Gomez   9 mg at 06/13/24 2031    ondansetron (Zofran) tablet 4 mg  4 mg oral q8h PRN REUBEN Gomez        Or    ondansetron (Zofran) injection 4 mg  4 mg intravenous q8h PRN REUBEN Gomez        pantoprazole (ProtoNix) EC tablet 40 mg  40 mg oral Daily before breakfast REUBEN Gomez        Or    pantoprazole (ProtoNix) injection 40 mg  40 mg intravenous Daily before breakfast REUBEN Gomez        polyethylene glycol (Glycolax, Miralax) packet 17 g  17 g oral Daily PRN REUBEN Gomez        sodium chloride 0.9% infusion  - [employer] Pull                    Review of Systems   Respiratory:  Positive for cough and shortness of breath.    Genitourinary:         Right Lower Quadrant Pain   Right Flank  Pain    All other systems reviewed and are negative.          Physical Exam  Constitutional:       Appearance: She is ill-appearing.   HENT:      Head: Normocephalic and atraumatic.      Nose: Nose normal.      Mouth/Throat:      Mouth: Mucous membranes are moist.      Pharynx:  "Oropharynx is clear.   Eyes:      Conjunctiva/sclera: Conjunctivae normal.      Pupils: Pupils are equal, round, and reactive to light.   Cardiovascular:      Rate and Rhythm: Normal rate and regular rhythm.      Pulses: Normal pulses.      Heart sounds: Normal heart sounds.   Pulmonary:      Effort: Pulmonary effort is normal.      Comments: Bilateral fine crackles   Abdominal:      General: Abdomen is flat. Bowel sounds are normal.      Tenderness: There is abdominal tenderness. There is right CVA tenderness.   Musculoskeletal:         General: Normal range of motion.      Cervical back: Normal range of motion and neck supple.   Skin:     General: Skin is warm and dry.      Capillary Refill: Capillary refill takes less than 2 seconds.   Neurological:      General: No focal deficit present.      Mental Status: She is alert. Mental status is at baseline.   Psychiatric:         Mood and Affect: Mood normal.         Behavior: Behavior normal.            Last Recorded Vitals  Blood pressure 122/74, pulse 103, temperature 36.9 °C (98.4 °F), temperature source Oral, resp. rate 19, height 1.702 m (5' 7\"), weight 54.6 kg (120 lb 6.4 oz), SpO2 93%.    Relevant Results  Results for orders placed or performed during the hospital encounter of 06/13/24   Blood Culture    Specimen: Central Line/Catheter (Specify below); Blood culture   Result Value Ref Range    Blood Culture Loaded on Instrument - Culture in progress    Blood Culture    Specimen: Peripheral Venipuncture; Blood culture   Result Value Ref Range    Blood Culture Loaded on Instrument - Culture in progress    CBC and Auto Differential   Result Value Ref Range    WBC 7.8 4.4 - 11.3 x10*3/uL    nRBC 0.0 0.0 - 0.0 /100 WBCs    RBC 4.73 4.00 - 5.20 x10*6/uL    Hemoglobin 13.5 12.0 - 16.0 g/dL    Hematocrit 41.0 36.0 - 46.0 %    MCV 87 80 - 100 fL    MCH 28.5 26.0 - 34.0 pg    MCHC 32.9 32.0 - 36.0 g/dL    RDW 14.3 11.5 - 14.5 %    Platelets 153 150 - 450 x10*3/uL    " Neutrophils % 88.0 40.0 - 80.0 %    Immature Granulocytes %, Automated 0.3 0.0 - 0.9 %    Lymphocytes % 5.9 13.0 - 44.0 %    Monocytes % 5.5 2.0 - 10.0 %    Eosinophils % 0.0 0.0 - 6.0 %    Basophils % 0.3 0.0 - 2.0 %    Neutrophils Absolute 6.91 1.20 - 7.70 x10*3/uL    Immature Granulocytes Absolute, Automated 0.02 0.00 - 0.70 x10*3/uL    Lymphocytes Absolute 0.46 (L) 1.20 - 4.80 x10*3/uL    Monocytes Absolute 0.43 0.10 - 1.00 x10*3/uL    Eosinophils Absolute 0.00 0.00 - 0.70 x10*3/uL    Basophils Absolute 0.02 0.00 - 0.10 x10*3/uL   Comprehensive metabolic panel   Result Value Ref Range    Glucose 143 (H) 74 - 99 mg/dL    Sodium 137 136 - 145 mmol/L    Potassium 3.7 3.5 - 5.3 mmol/L    Chloride 101 98 - 107 mmol/L    Bicarbonate 28 21 - 32 mmol/L    Anion Gap 12 10 - 20 mmol/L    Urea Nitrogen 18 6 - 23 mg/dL    Creatinine 0.75 0.50 - 1.05 mg/dL    eGFR 86 >60 mL/min/1.73m*2    Calcium 9.0 8.6 - 10.3 mg/dL    Albumin 4.4 3.4 - 5.0 g/dL    Alkaline Phosphatase 56 33 - 136 U/L    Total Protein 7.4 6.4 - 8.2 g/dL    AST 25 9 - 39 U/L    Bilirubin, Total 0.4 0.0 - 1.2 mg/dL    ALT 20 7 - 45 U/L   B-Type Natriuretic Peptide   Result Value Ref Range    BNP 54 0 - 99 pg/mL   Magnesium   Result Value Ref Range    Magnesium 1.86 1.60 - 2.40 mg/dL   Influenza A, and B PCR   Result Value Ref Range    Flu A Result Not Detected Not Detected    Flu B Result Not Detected Not Detected   Sars-CoV-2 PCR   Result Value Ref Range    Coronavirus 2019, PCR Not Detected Not Detected   RSV PCR   Result Value Ref Range    RSV PCR Not Detected Not Detected   Urinalysis with Reflex Culture and Microscopic   Result Value Ref Range    Color, Urine Light-Yellow Light-Yellow, Yellow, Dark-Yellow    Appearance, Urine Clear Clear    Specific Gravity, Urine >1.050 (N) 1.005 - 1.035    pH, Urine 7.0 5.0, 5.5, 6.0, 6.5, 7.0, 7.5, 8.0    Protein, Urine 30 (1+) (A) NEGATIVE, 10 (TRACE), 20 (TRACE) mg/dL    Glucose, Urine Normal Normal mg/dL    Blood,  Urine 0.2 (2+) (A) NEGATIVE    Ketones, Urine TRACE (A) NEGATIVE mg/dL    Bilirubin, Urine NEGATIVE NEGATIVE    Urobilinogen, Urine Normal Normal mg/dL    Nitrite, Urine NEGATIVE NEGATIVE    Leukocyte Esterase, Urine NEGATIVE NEGATIVE   Extra Urine Gray Tube   Result Value Ref Range    Extra Tube Hold for add-ons.    Troponin I, High Sensitivity, Initial   Result Value Ref Range    Troponin I, High Sensitivity 6 0 - 13 ng/L   Lactate   Result Value Ref Range    Lactate 1.3 0.4 - 2.0 mmol/L   Blood Gas Venous Full Panel   Result Value Ref Range    POCT pH, Venous 7.43 7.33 - 7.43 pH    POCT pCO2, Venous 43 41 - 51 mm Hg    POCT pO2, Venous 31 (L) 35 - 45 mm Hg    POCT SO2, Venous 49 45 - 75 %    POCT Oxy Hemoglobin, Venous 48.5 45.0 - 75.0 %    POCT Hematocrit Calculated, Venous 44.0 36.0 - 46.0 %    POCT Sodium, Venous 135 (L) 136 - 145 mmol/L    POCT Potassium, Venous 3.9 3.5 - 5.3 mmol/L    POCT Chloride, Venous 100 98 - 107 mmol/L    POCT Ionized Calicum, Venous 1.10 1.10 - 1.33 mmol/L    POCT Glucose, Venous 148 (H) 74 - 99 mg/dL    POCT Lactate, Venous 1.4 0.4 - 2.0 mmol/L    POCT Base Excess, Venous 3.6 (H) -2.0 - 3.0 mmol/L    POCT HCO3 Calculated, Venous 28.5 (H) 22.0 - 26.0 mmol/L    POCT Hemoglobin, Venous 14.5 12.0 - 16.0 g/dL    POCT Anion Gap, Venous 10.0 10.0 - 25.0 mmol/L    Patient Temperature      FiO2 21 %   Lipase   Result Value Ref Range    Lipase 13 9 - 82 U/L   Troponin, High Sensitivity, 1 Hour   Result Value Ref Range    Troponin I, High Sensitivity 7 0 - 13 ng/L   ECG 12 lead   Result Value Ref Range    Ventricular Rate 112 BPM    Atrial Rate 112 BPM    NY Interval 162 ms    QRS Duration 90 ms    QT Interval 350 ms    QTC Calculation(Bazett) 477 ms    P Axis 77 degrees    R Axis 35 degrees    T Axis 62 degrees    QRS Count 18 beats    Q Onset 223 ms    P Onset 142 ms    P Offset 193 ms    T Offset 398 ms    QTC Fredericia 431 ms   Urinalysis Microscopic   Result Value Ref Range    WBC, Urine  1-5 1-5, NONE /HPF    RBC, Urine >20 (A) NONE, 1-2, 3-5 /HPF    Squamous Epithelial Cells, Urine 1-9 (SPARSE) Reference range not established. /HPF    Mucus, Urine 1+ Reference range not established. /LPF        Imaging   ECG 12 lead    Result Date: 6/13/2024  Sinus tachycardia Biatrial enlargement Nonspecific ST abnormality Abnormal ECG When compared with ECG of 23-JAN-2006 08:34, No significant change was found    CT abdomen pelvis w IV contrast    Result Date: 6/13/2024  Interpreted By:  Zelalem Sampson, STUDY: CT ABDOMEN PELVIS W IV CONTRAST;  6/13/2024 3:27 pm   INDICATION: 67 y/o   F with  Signs/Symptoms:rlq pain radiating to R flank hx kidney stones.   LIMITATIONS: None.   ACCESSION NUMBER(S): [id number]   ORDERING CLINICIAN: LA NENA SWENSON   TECHNIQUE: After the administration of   oral water and IV nonionic contrast, spiral axial images were obtained from the xiphoid down through the symphysis pubis. Sagittal and coronal reconstruction images were generated. Bone, mediastinal, lung, and liver windows were reviewed. 75 ML Omnipaque 350   COMPARISON: The patient had CT pulmonary angiogram just prior to this exam. Patient also had noncontrast CT scan abdomen and pelvis from 10/13/2021.   FINDINGS: LUNG BASES: Please refer to report for CT pulmonary angiogram done just prior to this exam for information.   LIVER: No hepatomegaly. Liver density was  within the limits of normal. No  liver lesion evident in this  exam.   GALLBLADDER: No calcified stone, gallbladder wall thickening, or adjacent edema.   BILE DUCTS: No intrahepatic biliary ductal dilatation.  Common bile duct was within the limits of normal.   SPLEEN: No splenomegaly. No splenic mass..   PANCREAS: No pancreatic mass or inflammation, or ductal dilatation.   KIDNEYS/ADRENALS: No adrenal mass or enlargement. There are several tiny right renal cortical cysts measuring 8 mm in diameter or less. There is an anterior midpole  left renal cyst measuring  9 mm. There is a nonobstructing 4 mm lower pole left renal stone. No hydronephrosis, mass, or perinephric edema in either kidney. No ureteral stone or dilatation.   BLADDER/PELVIS: Urinary bladder was grossly intact. Retroflexed uterus. Small calcified uterine fibroids. No gross adnexal mass. .   GREAT VESSELS/RETROPERITONEUM: Mild aortoiliac calcifications. Otherwise, abdominal aorta and IVC were intact. No suspicious retroperitoneal adenopathy. No suspicious mesenteric adenopathy. No suspicious pelvic or inguinal adenopathy.   PERITONEUM: Mild nonspecific free pelvic fluid in the cul-de-sac. No pneumoperitoneum. No peritoneal or mesenteric mass or inflammation.   BOWEL: The stomach was  grossly intact. There was no small bowel dilatation or small bowel wall thickening. No small-bowel obstruction. Mild scattered retained colonic stool and gas. There was no colonic wall thickening or large bowel obstruction. No edema adjacent to the colon. The cecal appendix was intact.   BONES: No destructive lytic or blastic bone lesion. Trace anterolisthesis of L3 over L4 and of L4 over L5. Multilevel mid and distal lumbar spine interfacet hypertrophy with spur formation. Mild endplate osteophytosis from L2-3 through L5-S1. Interval right hip arthroplasty. Sclerotic arthritic changes in both SI joints.   ABDOMINAL WALL: Unremarkable.       Mild nonspecific free pelvic fluid.   Retroflexed uterus with small uterine fibroids.   Nonobstructing lower pole left renal stone. No hydronephrosis on either side.   Small bilateral renal cysts.   Interval right hip arthroplasty.   Arthritic changes in the lumbosacral spine as described.   MACRO: None   Signed by: Zelalem Sampson 6/13/2024 4:03 PM Dictation workstation:   [id number]    CT angio chest for pulmonary embolism    Result Date: 6/13/2024  Interpreted By:  Zelalem Sampson, STUDY: CT ANGIO CHEST FOR PULMONARY EMBOLISM;  6/13/2024 3:27 pm   INDICATION: 67 y/o   F with   Signs/Symptoms:sob, tachy r/o PPE.   LIMITATIONS: None.   ACCESSION NUMBER(S): [id number]   ORDERING CLINICIAN: LA NENA SWENSON   TECHNIQUE: After the administration of intravenous nonionic contrast, thin-section arterial phase images were obtained  from the thoracic inlet down through the iliac crest. Sagittal and coronal reconstruction images were generated. Axial and coronal MIP vascular reconstruction images were also generated.   Mediastinal, lung, bone, and liver windows were reviewed. 75 ML Omnipaque 350     COMPARISON: Patient also had CT scan abdomen and pelvis immediately after the current study today. Previous CT scans of the abdomen and pelvis, most recent of which is from 10/13/2021.   FINDINGS: CHEST WALL/BASE OF THE NECK: Previous left mastectomy. Otherwise, the chest wall was grossly intact. Thyroid is either absent or atrophic.   MEDIASTINUM/TANVIR: No suspicious mediastinal, hilar, or axillary mass or adenopathy. Small calcified lymph nodes in each hilum. Mild gaseous and debris distension of the mid esophagus. No gross esophageal wall thickening. No cardiomegaly. No pericardial effusion. No thoracic aortic aneurysm or dissection. Aberrant origin of the right subclavian artery, a variant of normal. No pulmonary artery filling defect.   LUNGS/ PLEURA/ AND TRACHEA: There are mild emphysematous changes in the lungs with upper lobe predominance. There is a 4 mm nodule in the superior segment of the right lower lobe on image 115. There are several nonspecific noncalcified right lower lobe nodules including a 4 mm nodule on image 198, a 5 mm nodule on image 211, a 10 mm nodule on image 225, and an 11 mm pleural-based nodule on image 248. Some of the nodules appear somewhat inflammatory. There is a band density in the medial left upper lobe centered on image 65 that has a somewhat inflammatory appearance. Small grouping of stable reticulonodular densities laterally in the left lower lobe on image 220,  consistent with sequela of remote infection. There is inflammatory appearing nodule posteriorly in the left lower lobe measuring 6 mm on image 212. Posterior perihilar left lower lobe calcified granuloma on image 186. No  pleural effusion or pneumothorax. The trachea was grossly intact.   BONES: No destructive lytic or blastic bone lesion.   UPPER ABDOMEN: Please refer to report for CT scan abdomen and pelvis done just after this exam for information.       No CT evidence of pulmonary embolism in the current exam.   Mild emphysema with upper lobe predominance. Band of linear scarring versus less likely atelectasis in the medial left lung apex.   Findings of prior granulomatous disease as described.   Stable small grouping of reticulonodular densities in the posterolateral left lower lobe consistent with sequela of remote infection or inflammation.   There are several nonspecific, noncalcified right lower lobe pulmonary nodules which were not clearly evident on the previous CT scans of the abdomen and pelvis measuring 11 mm in diameter or less. There is also a small inflammatory appearing 6 mm left lower lobe lung nodule which was not evident previously. These could be foci of inflammation or infection. These could be development of interval scars. Tumor is not excluded. Consider either a follow-up CT scan in 3-6 months, or a PET-CT scan in follow-up.   Mild nonspecific distention of the mid esophagus with gas and ingested material. Consider follow-up with upper endoscopy or barium esophagram.   Incidental note of aberrant origin of the right subclavian artery, a variant of normal.   MACRO: None   Signed by: Zelalem Sampson 6/13/2024 3:53 PM Dictation workstation:   [id number]    BD DXA TRABECULAR BONE SCORE (TBS)    Result Date: 5/30/2024  * * *Final Report* * * DATE OF EXAM: May 28 2024  1:52PM   SELAM   0801  -  BD DXA TRABECLR BONE SCORE (TBS)  / ACCESSION #  [id number] PROCEDURE REASON: Age-related osteoporosis  without current pathological fracture      * * * * Physician Interpretation * * * *  EXAM: DXA -  DXA BONE DENSITOMETRY INTERPRETATION ENDOCRINE CALCIUM CLINIC PATIENT DEMOGRAPHICS   Age: 68 years, Race: C, Gender: Female, Height: 167 cm, Weight: 54.2 Kg, BMI: 19.42 Kg/m2 SCANNER INFORMATION DXA Model: [employer] W (S/N [id number]) DXA SITE: [facility] Site Scanned: lumbar spine, left femur, VFA; with Trabecular Bone Scoring Date Scanned:  5/28/2024 1:52 PM Previous scan(s): OS CLINICAL HISTORY:   Age-related osteoporosis without current pathological fracture   . RISK FACTORS FOR OSTEOPOROSIS AND ASSOCIATED FRACTURES REPORTED BY THE PATIENT Prior fragility fracture, Breast cancer,   Postmenopausal. CURRENT THERAPY Vitamin D, denosumab. TECHNICAL LIMITATIONS OF THIS DXA Degenerative disease of the spine,  The previous bone density study was performed on a different machine, thus no comparison can be made, RESULTS: Lumbar spine (L1-L4): 0.771 g/cm2, T-score -2.5, Z-score -0.4.  TBS Adjusted T-score -2.8 Left Femoral Neck: 0.457 g/cm2, T-score -3.5, Z-score -1.8.   TBS   Adjusted T-score -3.7 Left Total Hip: 0.563 g/cm2, T-score -3.1, Z-score -1.6.  TBS Adjusted T-score -3.2 VERTEBRAL FRACTURE ASSESSMENT Based on qualitative review:T4 - L4 visualized; no compression deformities CHANGE IS STATISTICALLY SIGNIFICANT IN THE SPINE OR HIP IF GREATER THAN OR EQUAL TO 0.04 g/cm2    IMPRESSION: THE LOWEST T-SCORE IS -3.7  IN THE Left Femoral Neck DIAGNOSIS (based on BMD alone):  Osteoporosis Caution: Medical conditions other than osteoporosis may cause low bone density, such as osteomalacia or renal osteodystrophy.  Clinical correlation is necessary. FRACTURE RISK (based on BMD alone):  Increased Caution: Fracture risk may be increased independent of BMD in patients with corticosteroid use, age greater than 65 years, or a history of prior fragility fracture. FRACTURE RISK - FRAX 10-Year Probability  of Fracture  -- not applicable; pt has been receiving treatment      / RISK ADJUSTED FOR TRABECULAR BONE SCORE TRABECULAR BONE SCORE ASSESSMENT (Spine TBS normal microarchitecture > 1.31)       TBS L1- L4: 1.245   Partially degraded microarchitecture TBS SKELETAL STATUS ASSESSMENT: The TBS is derived from the texture of the DXA image and has been shown to be related to bone microarchitecture and fracture risk independent of bone mineral density.   According to the National Osteoporosis Foundation (NOF), healthcare providers should consider FDA-approved medical therapies in postmenopausal women and men aged 50 years and older, based on the following: *  A hip or vertebral (clinical or morphometric) fracture *  T-score less than or equal to -2.5 at the femoral neck or spine after appropriate evaluation to exclude secondary causes in a previously untreated patient *  Low bone mass (T-score between -1.0 and -2.5 at the femoral neck or spine) AND a 10-year probability of a hip fracture greater than or equal to 3% OR a 10-year probability of a major osteoporosis-related fracture greater than or equal to 20% based on the [place]-adapted WHO FRAX algorithm in a previously untreated patient *  Clinician's judgment and/or patient preferences may indicate treatment for people with 10-year fracture probabilities above or below these levels RECOMMENDATIONS All patients should consume adequate sources of calcium and vitamin D, as clinically indicated, preferably from the diet.  The NOF supports the National Academy of Sciences recommendation that women older than age 50 consume at least 1,200 mg per day of calcium.   Adults aged 19 to 70 years require at least 600 IU/day of vitamin D to maximize bone health.   Serum 25(OH)D should be maintained above 40 ng/mL, 1500 to 2000 IU/day of vitamin D  or more may be required, and should be taken with a meal. Weight bearing exercises and strength training should be considered. Smoking  cessation, moderation of intake of alcohol, and limiting caffeine to one small beverage daily are recommended. The final decision regarding diagnostic or therapeutic recommendations should include BMD, TBS, additional clinical risk factors as well as the clinical content of the patient Follow-up in 2 years or as clinically indicated.  Patients that are taking corticosteroids, are transplant recipients, or have hyperparathyroidism should have annual follow-up.  Follow-up scans should always be done on the same machine for accurate comparison. Interpreted by: Hina Cobos MS, MD,  CCD, FACE FOR MORE INFORMATION: National Osteoporosis Foundation:  www.nof.org International Society of Clinical Densitometry:  www.iscd.org : [place]   Transcribe Date/Time: May 30 2024 10:17A Dictated by : HINA COBOS MD This examination was interpreted and the report reviewed and electronically signed by: HINA COBOS MD on May 30 2024 10:24AM  EST    DXA-AXIAL SKELETON WITH VFA    Result Date: 5/30/2024  * * *Final Report* * * DATE OF EXAM: May 28 2024  1:52PM   MMB   0802  -  BD VFA WITH DXA - AXIAL SKELETON  / ACCESSION #  [id number] PROCEDURE REASON: Age-related osteoporosis without current pathological fracture      * * * * Physician Interpretation * * * *  EXAM: DXA -  DXA BONE DENSITOMETRY INTERPRETATION ENDOCRINE [facility] PATIENT DEMOGRAPHICS   Age: 68 years, Race: C, Gender: Female, Height: 167 cm, Weight: 54.2 Kg, BMI: 19.42 Kg/m2 SCANNER INFORMATION DXA Model: [employer] W (S/N [id number]) DXA SITE: [facility] Site Scanned: lumbar spine, left femur, VFA; with Trabecular Bone Scoring Date Scanned:  5/28/2024 1:52 PM Previous scan(s): [facility] CLINICAL HISTORY:   Age-related osteoporosis without current pathological fracture   . RISK FACTORS FOR OSTEOPOROSIS AND ASSOCIATED FRACTURES REPORTED BY THE PATIENT Prior fragility fracture, Breast cancer,   Postmenopausal. CURRENT  THERAPY Vitamin D, denosumab. TECHNICAL LIMITATIONS OF THIS DXA Degenerative disease of the spine,  The previous bone density study was performed on a different machine, thus no comparison can be made, RESULTS: Lumbar spine (L1-L4): 0.771 g/cm2, T-score -2.5, Z-score -0.4.  TBS Adjusted T-score -2.8 Left Femoral Neck: 0.457 g/cm2, T-score -3.5, Z-score -1.8.   TBS   Adjusted T-score -3.7 Left Total Hip: 0.563 g/cm2, T-score -3.1, Z-score -1.6.  TBS Adjusted T-score -3.2 VERTEBRAL FRACTURE ASSESSMENT Based on qualitative review:T4 - L4 visualized; no compression deformities CHANGE IS STATISTICALLY SIGNIFICANT IN THE SPINE OR HIP IF GREATER THAN OR EQUAL TO 0.04 g/cm2    IMPRESSION: THE LOWEST T-SCORE IS -3.7  IN THE Left Femoral Neck DIAGNOSIS (based on BMD alone):  Osteoporosis Caution: Medical conditions other than osteoporosis may cause low bone density, such as osteomalacia or renal osteodystrophy.  Clinical correlation is necessary. FRACTURE RISK (based on BMD alone):  Increased Caution: Fracture risk may be increased independent of BMD in patients with corticosteroid use, age greater than 65 years, or a history of prior fragility fracture. FRACTURE RISK - FRAX 10-Year Probability of Fracture  -- not applicable; pt has been receiving treatment      / RISK ADJUSTED FOR TRABECULAR BONE SCORE TRABECULAR BONE SCORE ASSESSMENT (Spine TBS normal microarchitecture > 1.31)       TBS L1- L4: 1.245   Partially degraded microarchitecture TBS SKELETAL STATUS ASSESSMENT: The TBS is derived from the texture of the DXA image and has been shown to be related to bone microarchitecture and fracture risk independent of bone mineral density.   According to the National Osteoporosis Foundation (NOF), healthcare providers should consider FDA-approved medical therapies in postmenopausal women and men aged 50 years and older, based on the following: *  A hip or vertebral (clinical or morphometric) fracture *  T-score less than or equal  to -2.5 at the femoral neck or spine after appropriate evaluation to exclude secondary causes in a previously untreated patient *  Low bone mass (T-score between -1.0 and -2.5 at the femoral neck or spine) AND a 10-year probability of a hip fracture greater than or equal to 3% OR a 10-year probability of a major osteoporosis-related fracture greater than or equal to 20% based on the [place]-adapted WHO FRAX algorithm in a previously untreated patient *  Clinician's judgment and/or patient preferences may indicate treatment for people with 10-year fracture probabilities above or below these levels RECOMMENDATIONS All patients should consume adequate sources of calcium and vitamin D, as clinically indicated, preferably from the diet.  The NOF supports the National Academy of Sciences recommendation that women older than age 50 consume at least 1,200 mg per day of calcium.   Adults aged 19 to 70 years require at least 600 IU/day of vitamin D to maximize bone health.   Serum 25(OH)D should be maintained above 40 ng/mL, 1500 to 2000 IU/day of vitamin D  or more may be required, and should be taken with a meal. Weight bearing exercises and strength training should be considered. Smoking cessation, moderation of intake of alcohol, and limiting caffeine to one small beverage daily are recommended. The final decision regarding diagnostic or therapeutic recommendations should include BMD, TBS, additional clinical risk factors as well as the clinical content of the patient Follow-up in 2 years or as clinically indicated.  Patients that are taking corticosteroids, are transplant recipients, or have hyperparathyroidism should have annual follow-up.  Follow-up scans should always be done on the same machine for accurate comparison. Interpreted by: Hina Cobos MS, MD,  CCD, FACE FOR MORE INFORMATION: National Osteoporosis Foundation:  www.nof.org International Society of Clinical Densitometry:  www.iscd.org :  PSCB   Transcribe Date/Time: May 30 2024 10:17A Dictated by : SEN CARRASCO MD This examination was interpreted and the report reviewed and electronically signed by: SEN CARRASCO MD on May 30 2024 10:24AM  EST        Assessment/Plan   Principal Problem:    Community acquired bacterial pneumonia  - Chills, malaise, productive cough, shortness of breath x 3-4 days. - -WBC 7.8   - Lactate 1.3   - COVID Flu RSV Negative   - CT PE: No CT evidence of pulmonary embolism in the current exam.   Mild emphysema with upper lobe predominance. Band of linear scarring versus less likely atelectasis in the medial left lung apex.   Findings of prior granulomatous disease as described.   Stable small grouping of reticulonodular densities in the posterolateral left lower lobe consistent with sequela of remote infection or inflammation.   - BCx, sputum culture pending   - Patient received Rocephin and  azithromycin x 1 dose in ED   - Continue Rocephin and azithromycin   - Start Solumedrol 40 mg IVP q8h   - Mucinex twice a day, Tylenol as needed for pain/fever   - RT eval and treat protocol, additional inhalers/breathing treatments per RT   - DuoNebs q6h as needed   - Bronchial hygiene q4h   - Home O2:  None   - Wean O2 as tolerated; patient currently on RA  -Monitor VS   -Tele Monitor   -Repeat labs in AM     Right Lower Quadrant Pain   Patient endorses right lower quadrant pain that radiates into the right flank. Patient does have history of kidney stones. Patient states it feels similar to when she has had kidney stones in the past.   -Ordered Renal US     Generalized Weakness   PT OT Consult and Treat     Hypothyroidism  Continue Levothyroxine Cytomel    Anxiety/Depression  Patient denies worsening anxiety and depression   Continue Zoloft        DVT Prophylaxis Lovenox   Fluids: NS @ 75 ml/HR   Electrolytes: replace as needed  Nutrition: Cardiac  No  Added Salt   Adjuncts: PIV        Total accumulated time spent face to face  and not face to face preparing to see the patient, obtaining and reviewing separately obtained history; performing a medically appropriate examination and/or evaluation; counseling and educating the patient, family; ordering medications, tests, or procedures; referring and communicating with other health care professionals; documenting clinical information in the patient's medical record; independently interpreting results and communicating the results to the patient, family; and care coordination was 60 minutes      REUBEN Alberts

## 2024-06-15 VITALS
SYSTOLIC BLOOD PRESSURE: 142 MMHG | WEIGHT: 120.4 LBS | HEIGHT: 67 IN | BODY MASS INDEX: 18.9 KG/M2 | RESPIRATION RATE: 17 BRPM | OXYGEN SATURATION: 95 % | HEART RATE: 82 BPM | TEMPERATURE: 99.5 F | DIASTOLIC BLOOD PRESSURE: 81 MMHG

## 2024-06-15 LAB
ANION GAP SERPL CALC-SCNC: 12 MMOL/L (ref 10–20)
BUN SERPL-MCNC: 13 MG/DL (ref 6–23)
CALCIUM SERPL-MCNC: 8.4 MG/DL (ref 8.6–10.3)
CHLORIDE SERPL-SCNC: 106 MMOL/L (ref 98–107)
CO2 SERPL-SCNC: 27 MMOL/L (ref 21–32)
CREAT SERPL-MCNC: 0.63 MG/DL (ref 0.5–1.05)
EGFRCR SERPLBLD CKD-EPI 2021: >90 ML/MIN/1.73M*2
ERYTHROCYTE [DISTWIDTH] IN BLOOD BY AUTOMATED COUNT: 14.6 % (ref 11.5–14.5)
GLUCOSE SERPL-MCNC: 91 MG/DL (ref 74–99)
HCT VFR BLD AUTO: 37.1 % (ref 36–46)
HGB BLD-MCNC: 12.1 G/DL (ref 12–16)
MCH RBC QN AUTO: 28.1 PG (ref 26–34)
MCHC RBC AUTO-ENTMCNC: 32.6 G/DL (ref 32–36)
MCV RBC AUTO: 86 FL (ref 80–100)
NRBC BLD-RTO: 0 /100 WBCS (ref 0–0)
PLATELET # BLD AUTO: 151 X10*3/UL (ref 150–450)
POTASSIUM SERPL-SCNC: 3.6 MMOL/L (ref 3.5–5.3)
RBC # BLD AUTO: 4.31 X10*6/UL (ref 4–5.2)
SODIUM SERPL-SCNC: 141 MMOL/L (ref 136–145)
WBC # BLD AUTO: 7.3 X10*3/UL (ref 4.4–11.3)

## 2024-06-15 PROCEDURE — G0378 HOSPITAL OBSERVATION PER HR: HCPCS

## 2024-06-15 PROCEDURE — 2500000004 HC RX 250 GENERAL PHARMACY W/ HCPCS (ALT 636 FOR OP/ED): Mod: IPSPLIT

## 2024-06-15 PROCEDURE — 80048 BASIC METABOLIC PNL TOTAL CA: CPT | Mod: IPSPLIT

## 2024-06-15 PROCEDURE — 85027 COMPLETE CBC AUTOMATED: CPT | Mod: IPSPLIT

## 2024-06-15 PROCEDURE — 99231 SBSQ HOSP IP/OBS SF/LOW 25: CPT | Performed by: NURSE PRACTITIONER

## 2024-06-15 PROCEDURE — 2500000001 HC RX 250 WO HCPCS SELF ADMINISTERED DRUGS (ALT 637 FOR MEDICARE OP): Mod: IPSPLIT

## 2024-06-15 PROCEDURE — 36415 COLL VENOUS BLD VENIPUNCTURE: CPT | Mod: IPSPLIT

## 2024-06-15 PROCEDURE — 2500000002 HC RX 250 W HCPCS SELF ADMINISTERED DRUGS (ALT 637 FOR MEDICARE OP, ALT 636 FOR OP/ED): Mod: IPSPLIT | Performed by: NURSE PRACTITIONER

## 2024-06-15 RX ORDER — AZITHROMYCIN 500 MG/1
500 TABLET, FILM COATED ORAL DAILY
Qty: 3 TABLET | Refills: 0 | Status: SHIPPED | OUTPATIENT
Start: 2024-06-15 | End: 2024-06-18

## 2024-06-15 RX ORDER — PREDNISONE 20 MG/1
40 TABLET ORAL DAILY
Qty: 10 TABLET | Refills: 0 | Status: SHIPPED | OUTPATIENT
Start: 2024-06-15 | End: 2024-06-20

## 2024-06-15 RX ORDER — CEFUROXIME AXETIL 500 MG/1
500 TABLET ORAL 2 TIMES DAILY
Qty: 10 TABLET | Refills: 0 | Status: SHIPPED | OUTPATIENT
Start: 2024-06-15 | End: 2024-06-20

## 2024-06-15 RX ADMIN — GUAIFENESIN AND DEXTROMETHORPHAN 5 ML: 100; 10 SYRUP ORAL at 03:01

## 2024-06-15 RX ADMIN — LEVOTHYROXINE SODIUM 88 MCG: 88 TABLET ORAL at 05:23

## 2024-06-15 RX ADMIN — PREDNISONE 40 MG: 20 TABLET ORAL at 09:23

## 2024-06-15 RX ADMIN — GUAIFENESIN 600 MG: 600 TABLET, EXTENDED RELEASE ORAL at 09:23

## 2024-06-15 RX ADMIN — SERTRALINE HYDROCHLORIDE 50 MG: 50 TABLET ORAL at 09:23

## 2024-06-15 RX ADMIN — AZITHROMYCIN MONOHYDRATE 500 MG: 500 INJECTION, POWDER, LYOPHILIZED, FOR SOLUTION INTRAVENOUS at 10:17

## 2024-06-15 RX ADMIN — PANTOPRAZOLE SODIUM 40 MG: 40 TABLET, DELAYED RELEASE ORAL at 06:21

## 2024-06-15 RX ADMIN — FLUTICASONE PROPIONATE 2 SPRAY: 50 SPRAY, METERED NASAL at 09:23

## 2024-06-15 RX ADMIN — CEFTRIAXONE 2 G: 2 INJECTION, SOLUTION INTRAVENOUS at 09:22

## 2024-06-15 ASSESSMENT — PAIN SCALES - GENERAL: PAINLEVEL_OUTOF10: 0 - NO PAIN

## 2024-06-15 NOTE — CARE PLAN
The clinical goals for the shift include Patient will tolerate IV abx this shift.    This shift the patient tolerated IV abx well with no adverse effects. Vitals remained stable this shift, no reports of pain. Patient has been ambulating in room and in halls with  who has been at bedside all morning. Discharge instructions discussed with patient who verbalized understanding. IV removed and discharge vitals taken. Patient discharged home at 12:10.

## 2024-06-15 NOTE — DISCHARGE SUMMARY
Discharge Diagnosis  Community acquired bacterial pneumonia  Abdominal pain  Generalized weakness    Issues Requiring Follow-Up      Discharge Meds     Your medication list        START taking these medications        Instructions Last Dose Given Next Dose Due   azithromycin 500 mg tablet  Commonly known as: Zithromax      Take 1 tablet (500 mg) by mouth once daily for 3 days.       cefuroxime 500 mg tablet  Commonly known as: Ceftin      Take 1 tablet (500 mg) by mouth 2 times a day for 5 days.       predniSONE 20 mg tablet  Commonly known as: Deltasone      Take 2 tablets (40 mg) by mouth once daily for 5 doses.              CONTINUE taking these medications        Instructions Last Dose Given Next Dose Due   atorvastatin 20 mg tablet  Commonly known as: Lipitor           denosumab 60 mg/mL syringe  Commonly known as: Prolia           levothyroxine 75 mcg tablet  Commonly known as: Synthroid, Levoxyl           liothyronine 5 mcg tablet  Commonly known as: Cytomel           melatonin 1 mg tablet,chewable           sertraline 50 mg tablet  Commonly known as: Zoloft                     Where to Get Your Medications        These medications were sent to [id number] - [facility] [address] [facility]  [address] [place] [id number]      Phone: [phone]   azithromycin 500 mg tablet  cefuroxime 500 mg tablet  predniSONE 20 mg tablet         Test Results Pending At Discharge  Pending Labs       Order Current Status    Blood Culture Preliminary result    Blood Culture Preliminary result        68 y.o. female presented to [facility] from home. Chief complaint of worsening general  malaise related  to  cough and congestion for past three days. Pt states that for the last week there has had more trouble breathing, and patient  reports  unproductive coughing. In ED WBC 7.8   Lactate 1.3   COVID Flu RSV Negative  No CT evidence of pulmonary embolism in the current exam.   Mild emphysema with  upper lobe predominance. Band of linear scarring versus less likely atelectasis in the medial left lung apex.   Findings of prior granulomatous disease as described.   Stable small grouping of reticulonodular densities in the posterolateral left lower lobe consistent with sequela of remote infection or inflammation.  Patient was being treated in ED with plan to discharge home when she developed increased weakness and inability to  ambulate. Patient c/o  right flank pain that she stated was similar to pmh of renal  calculi. On Exam patient  sitting on side of bed. She has diminished breath sounds on auscultation, mild crackles in bilateral fields, no wheezing or distress, conversational dyspnea, thorax symmetric, SpO2 92% on  RA No accessory muscle use. No tachypnea.  Patient c/o right flank pain radiating to her abdomen/  Patient denies Chest Pain, N/V/D     Hospital Course  Community Acquired bacterial Pneumonia  -CT chest: No CT evidence of pulmonary embolism in the current exam.  Mild emphysema with upper lobe predominance. Band of linear scarring versus less likely atelectasis in the medial left lung apex.  Findings of prior granulomatous disease as described.  Stable small grouping of reticulonodular densities in the  posterolateral left lower lobe consistent with sequela of remote  infection or inflammation.  There are several nonspecific, noncalcified right lower lobe  pulmonary nodules which were not clearly evident on the previous CT  scans of the abdomen and pelvis measuring 11 mm in diameter or less.  There is also a small inflammatory appearing 6 mm left lower lobe  lung nodule which was not evident previously. These could be foci of  inflammation or infection. These could be development of interval  scars. Tumor is not excluded. Consider either a follow-up CT scan in  3-6 months, or a PET-CT scan in follow-up.  Mild nonspecific distention of the mid esophagus with gas and  ingested material. Consider  follow-up with upper endoscopy or barium  esophagram.  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  -Discussed CT findings and the importance of follow up with patient and spouse  -WBC 7.8  -Covid/flu/rsv negative  -Blood culture negative to date  -Sputum cx pending   -Procal 0.02  -Stable on RA  -continue IV azithro, ceftriaxone (Day 2)  -Mucinex, fluticasone   -Bronchodilators  -PO prednisone x5 days      Abdominal pain  Hx of kidney stones  -CT abd/pelvis: Mild nonspecific free pelvic fluid.  Retroflexed uterus with small uterine fibroids. Nonobstructing lower pole left renal stone. No hydronephrosis on either side. Small bilateral renal cysts. Interval right hip arthroplasty.  Arthritic changes in the lumbosacral spine as described.  -Renal US: Bilateral nonobstructing renal calculi.   -UA: 2+blood  -Will need follow up UA with PCP     Generalized weakness  -Safety measures  -PT/OT evals      Essential HTN  HLD  -BP stable  -Continue atorvastatin  -Monitor BP and HR     Depression  -Continue sertaline     Hypothyroidism  -Continue levothyroxine      DVT ppx  -lovenox     PUD ppx  -pantoprazole     Code status: full     Disposition: Patient was stable to be discharged to home. She was discharged on azithromycin, cefuroxime, and prednisone. She will follow up with her PCP.    Total cumulative time spent in preparation of this discharge including documentation review, coordination of care with the medical team including PT/SW/care coordinators and treating consultants, discussion with patient and pertinent family members and finalization of prescriptions, follow-up appointments, and this discharge summary was approximately 45 minutes.     Pertinent Physical Exam At Time of Discharge  Physical Exam  Vitals reviewed.   Constitutional:       Appearance: She is ill-appearing.   HENT:      Head: Normocephalic and atraumatic.      Right Ear: External ear normal.      Left Ear:  External ear normal.      Nose: Nose normal.      Mouth/Throat:      Pharynx: Oropharynx is clear.   Eyes:      Conjunctiva/sclera: Conjunctivae normal.   Cardiovascular:      Rate and Rhythm: Normal rate and regular rhythm.      Pulses: Normal pulses.      Heart sounds: Normal heart sounds.   Pulmonary:      Comments: crackles  Abdominal:      General: Bowel sounds are normal.      Palpations: Abdomen is soft.      Tenderness: There is abdominal tenderness.   Musculoskeletal:         General: Normal range of motion.      Cervical back: Normal range of motion and neck supple.   Skin:     General: Skin is dry.   Neurological:      General: No focal deficit present.      Mental Status: She is alert and oriented to person, place, and time.   Psychiatric:         Mood and Affect: Mood normal.         Behavior: Behavior normal.   Outpatient Follow-Up  No future appointments.      Radha Mas, DARRYL-CNP

## 2024-06-16 LAB
BACTERIA BLD CULT: NORMAL
BACTERIA BLD CULT: NORMAL

## 2024-06-18 LAB
BACTERIA BLD CULT: NORMAL
BACTERIA BLD CULT: NORMAL

## 2024-06-20 LAB
ATRIAL RATE: 112 BPM
P AXIS: 77 DEGREES
P OFFSET: 193 MS
P ONSET: 142 MS
PR INTERVAL: 162 MS
Q ONSET: 223 MS
QRS COUNT: 18 BEATS
QRS DURATION: 90 MS
QT INTERVAL: 350 MS
QTC CALCULATION(BAZETT): 477 MS
QTC FREDERICIA: 431 MS
R AXIS: 35 DEGREES
T AXIS: 62 DEGREES
T OFFSET: 398 MS
VENTRICULAR RATE: 112 BPM